# Patient Record
Sex: MALE | Race: WHITE | NOT HISPANIC OR LATINO | Employment: FULL TIME | URBAN - METROPOLITAN AREA
[De-identification: names, ages, dates, MRNs, and addresses within clinical notes are randomized per-mention and may not be internally consistent; named-entity substitution may affect disease eponyms.]

---

## 2017-06-21 ENCOUNTER — OFFICE VISIT (OUTPATIENT)
Dept: URGENT CARE | Facility: CLINIC | Age: 52
End: 2017-06-21
Payer: COMMERCIAL

## 2017-06-21 VITALS
SYSTOLIC BLOOD PRESSURE: 138 MMHG | BODY MASS INDEX: 27.59 KG/M2 | WEIGHT: 215 LBS | TEMPERATURE: 99.1 F | OXYGEN SATURATION: 96 % | RESPIRATION RATE: 14 BRPM | HEIGHT: 74 IN | DIASTOLIC BLOOD PRESSURE: 92 MMHG | HEART RATE: 94 BPM

## 2017-06-21 DIAGNOSIS — J01.40 ACUTE NON-RECURRENT PANSINUSITIS: Primary | ICD-10-CM

## 2017-06-21 DIAGNOSIS — J06.9 URI WITH COUGH AND CONGESTION: ICD-10-CM

## 2017-06-21 PROCEDURE — 99214 OFFICE O/P EST MOD 30 MIN: CPT | Performed by: PHYSICIAN ASSISTANT

## 2017-06-21 RX ORDER — PROMETHAZINE HYDROCHLORIDE AND CODEINE PHOSPHATE 6.25; 1 MG/5ML; MG/5ML
5 SYRUP ORAL 4 TIMES DAILY PRN
Qty: 240 ML | Refills: 0 | Status: SHIPPED | OUTPATIENT
Start: 2017-06-21 | End: 2017-07-05

## 2017-06-21 RX ORDER — DOXYCYCLINE HYCLATE 100 MG
100 TABLET ORAL 2 TIMES DAILY
Qty: 14 TAB | Refills: 0 | Status: SHIPPED | OUTPATIENT
Start: 2017-06-21 | End: 2017-06-28

## 2017-06-21 RX ORDER — METHYLPREDNISOLONE 4 MG/1
TABLET ORAL
Qty: 21 TAB | Refills: 0 | Status: SHIPPED | OUTPATIENT
Start: 2017-06-21 | End: 2018-05-09

## 2017-06-21 ASSESSMENT — COPD QUESTIONNAIRES: COPD: 0

## 2017-06-21 ASSESSMENT — ENCOUNTER SYMPTOMS: COUGH: 1

## 2017-06-21 NOTE — PATIENT INSTRUCTIONS

## 2017-06-21 NOTE — PROGRESS NOTES
Subjective:      Pt is a 51 y.o. male who presents with Cough            Cough  This is a new problem. The current episode started in the past 7 days. The problem has been gradually worsening. The problem occurs constantly. The cough is productive of purulent sputum. The symptoms are aggravated by cold air, exercise and lying down. He has tried OTC cough suppressant for the symptoms. The treatment provided no relief. His past medical history is significant for bronchitis. There is no history of asthma, bronchiectasis, COPD or emphysema.   PT presents to  clinic today complaining of sore throat, fevers, chills, watery eyes, pressure in ears, cough, fatigue, runny nose. PT denies CP, SOB, NVD, abdominal pain, joint pain. PT states these symptoms began around 7 days ago and that the pt's girlfriend has been sick on and off for the last week. PT states the pain is a 6/10 with sinus pressure, aching in nature and worse at night. Pt has not taken any medications for this condition. The pt's medication list, problem list, and allergies have been evaluated and reviewed during today's visit.      PMH:  Negative per pt.      PSH:  Negative per pt.      Fam Hx:  noncontributory      Soc HX:  Social History     Social History   • Marital Status:      Spouse Name: N/A   • Number of Children: N/A   • Years of Education: N/A     Occupational History   • Not on file.     Social History Main Topics   • Smoking status: Never Smoker    • Smokeless tobacco: Not on file   • Alcohol Use: Not on file   • Drug Use: Not on file   • Sexual Activity: Not on file     Other Topics Concern   • Not on file     Social History Narrative         Medications:    Current outpatient prescriptions:   •  doxycycline (VIBRAMYCIN) 100 MG Tab, Take 1 Tab by mouth 2 times a day for 7 days., Disp: 14 Tab, Rfl: 0  •  promethazine-codeine (PHENERGAN-CODEINE) 6.25-10 MG/5ML Syrup, Take 5 mL by mouth 4 times a day as needed for up to 14 days., Disp: 240  "mL, Rfl: 0  •  MethylPREDNISolone (MEDROL DOSEPAK) 4 MG Tablet Therapy Pack, Use as directed, Disp: 21 Tab, Rfl: 0  •  rosuvastatin (CRESTOR) 20 MG TABS, Take 20 mg by mouth every evening., Disp: , Rfl:   •  fluticasone (FLONASE) 50 MCG/ACT nasal spray, Spray 2 Sprays in nose every day., Disp: 16 g, Rfl: 0  •  pseudoephedrine-guaifenesin (MUCINEX D)  MG per tablet, Take 1 Tab by mouth every 12 hours., Disp: 60 Tab, Rfl: 0  •  fexofenadine (ALLEGRA) 180 MG tablet, Take 1 Tab by mouth every day., Disp: 30 Tab, Rfl: 0      Allergies:  Review of patient's allergies indicates no known allergies.        Review of Systems   Respiratory: Positive for cough.    Constitutional: Positive for chills and malaise/fatigue.   HENT: Positive for congestion and sore throat. Negative for ear pain.    Eyes: Negative for blurred vision, double vision and photophobia.   Respiratory continued: Positive for cough and sputum production. Negative for hemoptysis, shortness of breath and wheezing.    Cardiovascular: Negative for chest pain and palpitations.   Gastrointestinal: Negative for nausea, vomiting, abdominal pain, diarrhea and constipation.   Genitourinary: Negative for dysuria and flank pain.   Musculoskeletal: Negative for falls and myalgias.   Skin: Negative for itching and rash.   Neurological: Positive for headaches. Negative for dizziness and tingling.   Endo/Heme/Allergies: Does not bruise/bleed easily.   Psychiatric/Behavioral: Negative for depression. The patient is not nervous/anxious.               Objective:     /92 mmHg  Pulse 94  Temp(Src) 37.3 °C (99.1 °F)  Resp 14  Ht 1.88 m (6' 2.02\")  Wt 97.523 kg (215 lb)  BMI 27.59 kg/m2  SpO2 96%     Physical Exam      Physical Exam   Constitutional: PT is oriented to person, place, and time. PT appears well-developed and well-nourished. No distress.   HENT:   Head: Normocephalic and atraumatic.   Right Ear: Hearing, tympanic membrane, external ear and ear canal " normal.   Left Ear: Hearing, tympanic membrane, external ear and ear canal normal.   Nose: Mucosal edema, rhinorrhea and sinus tenderness present. Right sinus exhibits frontal sinus tenderness. Left sinus exhibits frontal sinus tenderness.   Mouth/Throat: Uvula is midline. Mucous membranes are pale. Posterior oropharyngeal edema and posterior oropharyngeal erythema present. No oropharyngeal exudate.   Eyes: Conjunctivae normal and EOM are normal. Pupils are equal, round, and reactive to light. Right eye exhibits no discharge. Left eye exhibits no discharge.   Neck: Normal range of motion. Neck supple. No thyromegaly present.   Cardiovascular: Normal rate, regular rhythm, normal heart sounds and intact distal pulses.  Exam reveals no gallop and no friction rub.    No murmur heard.  Pulmonary/Chest: Effort normal. No respiratory distress.   Abdominal: Soft. Bowel sounds are normal. PT exhibits no distension and no mass. There is no tenderness. There is no rebound and no guarding.   Musculoskeletal: Normal range of motion. PT exhibits no edema and no tenderness.   Lymphadenopathy:     PT has no cervical adenopathy.   Neurological: Pt is alert and oriented to person, place, and time. Pt has normal reflexes. No cranial nerve deficit.   Skin: Skin is warm and dry. No rash noted. No erythema.   Psychiatric: PT has a normal mood and affect. Pt behavior is normal. Judgment and thought content normal.          Assessment/Plan:     1. Acute non-recurrent pansinusitis    - doxycycline (VIBRAMYCIN) 100 MG Tab; Take 1 Tab by mouth 2 times a day for 7 days.  Dispense: 14 Tab; Refill: 0  - MethylPREDNISolone (MEDROL DOSEPAK) 4 MG Tablet Therapy Pack; Use as directed  Dispense: 21 Tab; Refill: 0    2. URI with cough and congestion    - promethazine-codeine (PHENERGAN-CODEINE) 6.25-10 MG/5ML Syrup; Take 5 mL by mouth 4 times a day as needed for up to 14 days.  Dispense: 240 mL; Refill: 0  - MethylPREDNISolone (MEDROL DOSEPAK) 4 MG  Tablet Therapy Pack; Use as directed  Dispense: 21 Tab; Refill: 0    Rest, fluids encouraged.  OTC decongestant for congestion/cough  AVS with medical info given.  Pt was in full understanding and agreement with the plan.  Follow-up as needed if symptoms worsen or fail to improve.

## 2017-06-21 NOTE — MR AVS SNAPSHOT
"        Francesco Rodriguez   2017 2:45 PM   Office Visit   MRN: [id number]    Department:  [facility]   Dept Phone:  [phone]    Description:  Male : 1965   Provider:  Carl Slade PA-C           Reason for Visit     Cough Heavy dry cough      Allergies as of 2017     No Known Allergies      You were diagnosed with     Acute non-recurrent pansinusitis   [[id number]]  -  Primary     URI with cough and congestion   [[id number]]         Vital Signs     Blood Pressure Pulse Temperature Respirations Height Weight    138/92 mmHg 94 37.3 °C (99.1 °F) 14 1.88 m (6' 2.02\") 97.523 kg (215 lb)    Body Mass Index Oxygen Saturation Smoking Status             27.59 kg/m2 96% Never Smoker          Basic Information     Date Of Birth Sex Race Ethnicity Preferred Language    1965 Male White Non- English      Health Maintenance        Date Due Completion Dates    IMM DTaP/Tdap/Td Vaccine (1 - Tdap) 1986 ---            Current Immunizations     No immunizations on file.      Below and/or attached are the medications your provider expects you to take. Review all of your home medications and newly ordered medications with your provider and/or pharmacist. Follow medication instructions as directed by your provider and/or pharmacist. Please keep your medication list with you and share with your provider. Update the information when medications are discontinued, doses are changed, or new medications (including over-the-counter products) are added; and carry medication information at all times in the event of emergency situations     Allergies:  No Known Allergies          Medications  Valid as of: 2017 -  3:46 PM    Generic Name Brand Name Tablet Size Instructions for use    Doxycycline Hyclate (Tab) VIBRAMYCIN 100 MG Take 1 Tab by mouth 2 times a day for 7 days.        Fexofenadine HCl (Tab) ALLEGRA 180 MG Take 1 Tab by mouth every day.        Fluticasone Propionate (Suspension) FLONASE 50 " MCG/ACT Spray 2 Sprays in nose every day.        MethylPREDNISolone (Tablet Therapy Pack) MEDROL DOSEPAK 4 MG Use as directed        Promethazine-Codeine (Syrup) PHENERGAN-CODEINE 6.25-10 MG/5ML Take 5 mL by mouth 4 times a day as needed for up to 14 days.        Pseudoephedrine-Guaifenesin (TABLET SR 12 HR) MUCINEX D  MG Take 1 Tab by mouth every 12 hours.        Rosuvastatin Calcium (Tab) CRESTOR 20 MG Take 20 mg by mouth every evening.        .                 Medicines prescribed today were sent to:     [id number] - [place], [place] - [address]    [address] [place] [address]    Phone: [phone] Fax: [phone]    Open 24 Hours?: No      Medication refill instructions:       If your prescription bottle indicates you have medication refills left, it is not necessary to call your provider’s office. Please contact your pharmacy and they will refill your medication.    If your prescription bottle indicates you do not have any refills left, you may request refills at any time through one of the following ways: The online evidanza system (except Urgent Care), by calling your provider’s office, or by asking your pharmacy to contact your provider’s office with a refill request. Medication refills are processed only during regular business hours and may not be available until the next business day. Your provider may request additional information or to have a follow-up visit with you prior to refilling your medication.   *Please Note: Medication refills are assigned a new Rx number when refilled electronically. Your pharmacy may indicate that no refills were authorized even though a new prescription for the same medication is available at the pharmacy. Please request the medicine by name with the pharmacy before contacting your provider for a refill.        Instructions    Sinusitis, Adult  Sinusitis is redness, soreness, and inflammation of the paranasal sinuses. Paranasal sinuses are air  pockets within the bones of your face. They are located beneath your eyes, in the middle of your forehead, and above your eyes. In healthy paranasal sinuses, mucus is able to drain out, and air is able to circulate through them by way of your nose. However, when your paranasal sinuses are inflamed, mucus and air can become trapped. This can allow bacteria and other germs to grow and cause infection.  Sinusitis can develop quickly and last only a short time (acute) or continue over a long period (chronic). Sinusitis that lasts for more than 12 weeks is considered chronic.  CAUSES  Causes of sinusitis include:  · Allergies.  · Structural abnormalities, such as displacement of the cartilage that separates your nostrils (deviated septum), which can decrease the air flow through your nose and sinuses and affect sinus drainage.  · Functional abnormalities, such as when the small hairs (cilia) that line your sinuses and help remove mucus do not work properly or are not present.  SIGNS AND SYMPTOMS  Symptoms of acute and chronic sinusitis are the same. The primary symptoms are pain and pressure around the affected sinuses. Other symptoms include:  · Upper toothache.  · Earache.  · Headache.  · Bad breath.  · Decreased sense of smell and taste.  · A cough, which worsens when you are lying flat.  · Fatigue.  · Fever.  · Thick drainage from your nose, which often is green and may contain pus (purulent).  · Swelling and warmth over the affected sinuses.  DIAGNOSIS  Your health care provider will perform a physical exam. During your exam, your health care provider may perform any of the following to help determine if you have acute sinusitis or chronic sinusitis:  · Look in your nose for signs of abnormal growths in your nostrils (nasal polyps).  · Tap over the affected sinus to check for signs of infection.  · View the inside of your sinuses using an imaging device that has a light attached (endoscope).  If your health care  provider suspects that you have chronic sinusitis, one or more of the following tests may be recommended:  · Allergy tests.  · Nasal culture. A sample of mucus is taken from your nose, sent to a lab, and screened for bacteria.  · Nasal cytology. A sample of mucus is taken from your nose and examined by your health care provider to determine if your sinusitis is related to an allergy.  TREATMENT  Most cases of acute sinusitis are related to a viral infection and will resolve on their own within 10 days. Sometimes, medicines are prescribed to help relieve symptoms of both acute and chronic sinusitis. These may include pain medicines, decongestants, nasal steroid sprays, or saline sprays.  However, for sinusitis related to a bacterial infection, your health care provider will prescribe antibiotic medicines. These are medicines that will help kill the bacteria causing the infection.  Rarely, sinusitis is caused by a fungal infection. In these cases, your health care provider will prescribe antifungal medicine.  For some cases of chronic sinusitis, surgery is needed. Generally, these are cases in which sinusitis recurs more than 3 times per year, despite other treatments.  HOME CARE INSTRUCTIONS  · Drink plenty of water. Water helps thin the mucus so your sinuses can drain more easily.  · Use a humidifier.  · Inhale steam 3-4 times a day (for example, sit in the bathroom with the shower running).  · Apply a warm, moist washcloth to your face 3-4 times a day, or as directed by your health care provider.  · Use saline nasal sprays to help moisten and clean your sinuses.  · Take medicines only as directed by your health care provider.  · If you were prescribed either an antibiotic or antifungal medicine, finish it all even if you start to feel better.  SEEK IMMEDIATE MEDICAL CARE IF:  · You have increasing pain or severe headaches.  · You have nausea, vomiting, or drowsiness.  · You have swelling around your face.  · You  have vision problems.  · You have a stiff neck.  · You have difficulty breathing.     This information is not intended to replace advice given to you by your health care provider. Make sure you discuss any questions you have with your health care provider.     Document Released: 12/18/2006 Document Revised: 01/08/2016 Document Reviewed: 01/01/2013  [employer] Interactive Patient Education ©2016 [employer] Inc.            sellpoints Access Code: [email or web address]  Expires: 7/21/2017  3:02 PM    sellpoints  A secure, online tool to manage your health information     [employer]’s sellpoints® is a secure, online tool that connects you to your personalized health information from the privacy of your home -- day or night - making it very easy for you to manage your healthcare. Once the activation process is completed, you can even access your medical information using the sellpoints dario, which is available for free in the Apple Dario store or Google Play store.     sellpoints provides the following levels of access (as shown below):   My Chart Features   [facility] Doctor [facility]  Specialists [facility]  Urgent  Care [facility]  Primary Care  Doctor   Email your healthcare team securely and privately 24/7 X X X    Manage appointments: schedule your next appointment; view details of past/upcoming appointments X      Request prescription refills. X      View recent personal medical records, including lab and immunizations X X X X   View health record, including health history, allergies, medications X X X X   Read reports about your outpatient visits, procedures, consult and ER notes X X X X   See your discharge summary, which is a recap of your hospital and/or ER visit that includes your diagnosis, lab results, and care plan. X X       How to register for sellpoints:  1. Go to  [email or web address].  2. Click on the Sign Up Now box, which takes you to the New Member Sign Up page. You will need to provide the following  information:  a. Enter your DeskActive Access Code exactly as it appears at the top of this page. (You will not need to use this code after you’ve completed the sign-up process. If you do not sign up before the expiration date, you must request a new code.)   b. Enter your date of birth.   c. Enter your home email address.   d. Click Submit, and follow the next screen’s instructions.  3. Create a DeskActive ID. This will be your DeskActive login ID and cannot be changed, so think of one that is secure and easy to remember.  4. Create a DeskActive password. You can change your password at any time.  5. Enter your Password Reset Question and Answer. This can be used at a later time if you forget your password.   6. Enter your e-mail address. This allows you to receive e-mail notifications when new information is available in DeskActive.  7. Click Sign Up. You can now view your health information.    For assistance activating your DeskActive account, call [phone]

## 2018-05-09 ENCOUNTER — OFFICE VISIT (OUTPATIENT)
Dept: MEDICAL GROUP | Age: 53
End: 2018-05-09
Payer: COMMERCIAL

## 2018-05-09 VITALS
TEMPERATURE: 97.7 F | HEIGHT: 74 IN | OXYGEN SATURATION: 96 % | WEIGHT: 226 LBS | SYSTOLIC BLOOD PRESSURE: 142 MMHG | BODY MASS INDEX: 29 KG/M2 | DIASTOLIC BLOOD PRESSURE: 88 MMHG | HEART RATE: 76 BPM

## 2018-05-09 DIAGNOSIS — Z12.11 COLON CANCER SCREENING: ICD-10-CM

## 2018-05-09 DIAGNOSIS — E78.00 HYPERCHOLESTEROLEMIA: ICD-10-CM

## 2018-05-09 DIAGNOSIS — R03.0 ELEVATED BLOOD PRESSURE READING: ICD-10-CM

## 2018-05-09 DIAGNOSIS — Z12.83 SKIN CANCER SCREENING: ICD-10-CM

## 2018-05-09 DIAGNOSIS — Z23 NEED FOR VACCINATION: ICD-10-CM

## 2018-05-09 PROCEDURE — 99204 OFFICE O/P NEW MOD 45 MIN: CPT | Mod: 25 | Performed by: INTERNAL MEDICINE

## 2018-05-09 PROCEDURE — 90471 IMMUNIZATION ADMIN: CPT | Performed by: INTERNAL MEDICINE

## 2018-05-09 PROCEDURE — 90715 TDAP VACCINE 7 YRS/> IM: CPT | Performed by: INTERNAL MEDICINE

## 2018-05-09 ASSESSMENT — PATIENT HEALTH QUESTIONNAIRE - PHQ9: CLINICAL INTERPRETATION OF PHQ2 SCORE: 0

## 2018-05-09 NOTE — ASSESSMENT & PLAN NOTE
Patient stated that he has high cholesterol and was treated with Crestor for 3 years. He stopped taking Crestor 3 years ago as his cholesterol level improved. He has been control his cholesterol with diet and physical exercise. He has not rechecked his lipid panel lately. He has family history of coronary artery disease in both parents at age 60-65.

## 2018-05-09 NOTE — ASSESSMENT & PLAN NOTE
Patient stated that he does not have high blood pressure or hypertension, but he uses to have high blood pressure in doctor's office. He also stated that he had caffeinated energy pill this morning before he went to do exercise at gym. He has blood pressure monitoring at home. He can monitor his blood pressure at home. He used to drinks 2-3 Beers or 1-2 shot vodka 3 times a week when he goes out for dinner with friends. He denied headache, chest pain, shortness of breath.

## 2018-05-09 NOTE — PROGRESS NOTES
Francesco Rodriguez is a 52 y.o. male here to establish care and the evaluation and management of:      HPI:    Elevated blood pressure reading  Patient stated that he does not have high blood pressure or hypertension, but he uses to have high blood pressure in doctor's office. He also stated that he had caffeinated energy pill this morning before he went to do exercise at gym. He has blood pressure monitoring at home. He can monitor his blood pressure at home. He used to drinks 2-3 Beers or 1-2 shot vodka 3 times a week when he goes out for dinner with friends. He denied headache, chest pain, shortness of breath.    Hypercholesterolemia  Patient stated that he has high cholesterol and was treated with Crestor for 3 years. He stopped taking Crestor 3 years ago as his cholesterol level improved. He has been control his cholesterol with diet and physical exercise. He has not rechecked his lipid panel lately. He has family history of coronary artery disease in both parents at age 60-65.    Skin cancer screening  Patient has moles on his body. He also has seborrhoic keratoses. He stated that his brother was diagnosed with skin cancer that was removed. He did not know what type of skin cancer. He wants to refer to dermatology for skin cancer screening. He never has history of skin cancer.    Current medicines (including changes today)  Current Outpatient Prescriptions   Medication Sig Dispense Refill   • fluticasone (FLONASE) 50 MCG/ACT nasal spray Spray 2 Sprays in nose every day. 16 g 0   • fexofenadine (ALLEGRA) 180 MG tablet Take 1 Tab by mouth every day. 30 Tab 0     No current facility-administered medications for this visit.      He  has no past medical history on file.  He  has no past surgical history on file.  Social History   Substance Use Topics   • Smoking status: Never Smoker   • Smokeless tobacco: Never Used   • Alcohol use 8.4 oz/week     9 Cans of beer, 5 Shots of liquor per week      Comment: socially  "    Social History     Social History Narrative   • No narrative on file     Family History   Problem Relation Age of Onset   • Heart Disease Mother 60     CAD s/p cardiac bypass   • Heart Disease Father 65     CAD s/p cardiac bypass   • Hypertension Brother    • Hyperlipidemia Brother    • Diabetes Neg Hx    • Stroke Neg Hx      Family Status   Relation Status   • Mother    • Father Alive   • Brother Alive   • Maternal Grandmother    • Maternal Grandfather    • Paternal Grandmother    • Paternal Grandfather    • Brother Alive   • Neg Hx      Health Maintenance Topics with due status: Overdue       Topic Date Due    IMM DTaP/Tdap/Td Vaccine 1986    COLONOSCOPY 2017         ROS    Gen.: Denied weight change, appetite change, fatigue.  ENT: Denied sinus tenderness, nasal congestion, runny nose, or sore throat  CVS: Denied chest pain, palpitations, legs swelling.  Respiratory: Denied cough, shortness of breath, wheezing.  GI: Denied abdominal pain, constipation or diarrhea.  Endocrine: Denied temperature intolerance, increased frequency of urination, polyphagia or polydipsia.  Musculoskeletal: Denied back pain or joint pain.    All other systems reviewed and are negative     Objective:     Blood pressure 142/88, pulse 76, temperature 36.5 °C (97.7 °F), height 1.88 m (6' 2.02\"), weight 102.5 kg (226 lb), SpO2 96 %. Body mass index is 29 kg/m².  Physical Exam:    Constitutional: Well nourished and Well developed, Alert, no distress.  Skin: Warm, dry, good turgor, no rashes in visible areas.  Eye: Equal, round and reactive, conjunctiva clear, lids normal.  ENMT: Lips without lesions, good dentition, oropharynx clear.  Neck: Trachea midline, no masses, no thyromegaly. No cervical or supraclavicular lymphadenopathy.  Respiratory: Unlabored respiratory effort, lungs clear to auscultation, no wheezes, no ronchi.  Cardiovascular: Normal S1, S2, no murmur, no edema.  Abdomen: " Soft, non distended, non-tender, no masses, no hepatosplenomegaly. Bowel sound normal.  Extremities: No edema, no clubbing, no cyanosis.  Psych: Alert and oriented x3, normal affect and mood.        Assessment and Plan:   The following treatment plan was discussed       1. Hypercholesterolemia  - Not on medication currently. He wants to try to control cholesterol with diet and exercise.  - Advised to eat low fat, low carbohydrate and high fiber diet as well as do cardio physical exercise regularly.   - Recheck lab in 3 months for follow-up.  - COMP METABOLIC PANEL; Future  - LIPID PROFILE; Future    2. Elevated blood pressure reading  - Discussed to eat low salt diet, do regular physical exercise and cut down alcohol intake and caffeine intake.  - Recommend to monitor blood pressure and heart rate at home.  - CBC WITH DIFFERENTIAL; Future  - COMP METABOLIC PANEL; Future    3. Skin cancer screening  - Refer to dermatology for skin cancer screening  - REFERRAL TO DERMATOLOGY    4. Colon cancer screening  - Concerning for colon cancer screening. Discussed the risks and benefits of colonoscopy versus Cologuard. Refer to GI for colon cancer screening  - REFERRAL TO GASTROENTEROLOGY    5. Need for vaccination  - Tdap vaccine was given today after reviewing risks and benefits as well as side effects of vaccine.  - TDAP VACCINE =>8YO IM    Face-to-face time spent 50 minutes with patient and more than half of that time spent for counseling and cooperating of care for medical problems listed above.     Records requested.  Followup: Return in about 3 months (around 8/9/2018), or if symptoms worsen or fail to improve, for hypercholesterolemia, high blood pressure, lab review. sooner should new symptoms or problems arise.      Please note that this dictation was created using voice recognition software. I have made every reasonable attempt to correct obvious errors, but I expect that there may have unintended errors in text,  spelling, punctuation, or grammar that I did not discover.

## 2018-05-09 NOTE — ASSESSMENT & PLAN NOTE
Patient has moles on his body. He also has seborrhoic keratoses. He stated that his brother was diagnosed with skin cancer that was removed. He did not know what type of skin cancer. He wants to refer to dermatology for skin cancer screening. He never has history of skin cancer.

## 2018-08-13 ENCOUNTER — APPOINTMENT (OUTPATIENT)
Dept: MEDICAL GROUP | Age: 53
End: 2018-08-13
Payer: COMMERCIAL

## 2018-11-08 ENCOUNTER — APPOINTMENT (RX ONLY)
Dept: URBAN - METROPOLITAN AREA CLINIC 35 | Facility: CLINIC | Age: 53
Setting detail: DERMATOLOGY
End: 2018-11-08

## 2018-11-08 DIAGNOSIS — D22 MELANOCYTIC NEVI: ICD-10-CM

## 2018-11-08 DIAGNOSIS — L82.1 OTHER SEBORRHEIC KERATOSIS: ICD-10-CM

## 2018-11-08 DIAGNOSIS — Z71.89 OTHER SPECIFIED COUNSELING: ICD-10-CM

## 2018-11-08 DIAGNOSIS — L91.8 OTHER HYPERTROPHIC DISORDERS OF THE SKIN: ICD-10-CM

## 2018-11-08 DIAGNOSIS — L81.4 OTHER MELANIN HYPERPIGMENTATION: ICD-10-CM

## 2018-11-08 DIAGNOSIS — L20.9 ATOPIC DERMATITIS, UNSPECIFIED: ICD-10-CM | Status: RESOLVED

## 2018-11-08 PROBLEM — D22.5 MELANOCYTIC NEVI OF TRUNK: Status: ACTIVE | Noted: 2018-11-08

## 2018-11-08 PROCEDURE — ? ADDITIONAL NOTES

## 2018-11-08 PROCEDURE — ? COUNSELING

## 2018-11-08 PROCEDURE — ? OBSERVATION

## 2018-11-08 PROCEDURE — 99203 OFFICE O/P NEW LOW 30 MIN: CPT

## 2018-11-08 ASSESSMENT — LOCATION DETAILED DESCRIPTION DERM
LOCATION DETAILED: LEFT DISTAL CALF
LOCATION DETAILED: RIGHT ANTERIOR DISTAL THIGH
LOCATION DETAILED: LEFT PROXIMAL PRETIBIAL REGION
LOCATION DETAILED: RIGHT DISTAL POSTERIOR THIGH
LOCATION DETAILED: LEFT SUPERIOR LATERAL UPPER BACK
LOCATION DETAILED: RIGHT AXILLARY VAULT
LOCATION DETAILED: PERIUMBILICAL SKIN
LOCATION DETAILED: LEFT ANTERIOR DISTAL THIGH
LOCATION DETAILED: RIGHT PROXIMAL CALF
LOCATION DETAILED: RIGHT PROXIMAL PRETIBIAL REGION
LOCATION DETAILED: RIGHT SUPERIOR LATERAL MIDBACK
LOCATION DETAILED: RIGHT LATERAL UPPER BACK
LOCATION DETAILED: LEFT INFERIOR UPPER BACK
LOCATION DETAILED: RIGHT DISTAL CALF
LOCATION DETAILED: LEFT DISTAL POSTERIOR THIGH

## 2018-11-08 ASSESSMENT — LOCATION SIMPLE DESCRIPTION DERM
LOCATION SIMPLE: RIGHT UPPER BACK
LOCATION SIMPLE: RIGHT AXILLARY VAULT
LOCATION SIMPLE: RIGHT POSTERIOR THIGH
LOCATION SIMPLE: RIGHT CALF
LOCATION SIMPLE: RIGHT LOWER BACK
LOCATION SIMPLE: LEFT PRETIBIAL REGION
LOCATION SIMPLE: LEFT UPPER BACK
LOCATION SIMPLE: LEFT CALF
LOCATION SIMPLE: ABDOMEN
LOCATION SIMPLE: RIGHT THIGH
LOCATION SIMPLE: LEFT THIGH
LOCATION SIMPLE: RIGHT PRETIBIAL REGION
LOCATION SIMPLE: LEFT POSTERIOR THIGH

## 2018-11-08 ASSESSMENT — LOCATION ZONE DERM
LOCATION ZONE: LEG
LOCATION ZONE: AXILLAE
LOCATION ZONE: TRUNK

## 2018-11-08 NOTE — PROCEDURE: ADDITIONAL NOTES
Detail Level: Zone
Additional Notes: When patient gets into hot tubs he gets a “rash” patient states Another dermatologis called it eczema and prescribed a topical steroid that started with the letter “t” and was 0.1%. Patient changed the chemicals in the hot tub but still got the rash. Patient states no one else who enters the hot tub gets the rash; rash is very itchy but never lasts more than a week in one spot. Patient unsure if he had eczema as a child but he did have asthma, no new medications introduced per patient. \\Prisca. Russell recommends putting Vaseline or vanicream on the skin before getting in the tub; if rash lasts longer than one week patient advised to make an appointment with Dr. Wells to have rash evaluated.

## 2019-01-16 ENCOUNTER — OFFICE VISIT (OUTPATIENT)
Dept: URGENT CARE | Facility: CLINIC | Age: 54
End: 2019-01-16
Payer: COMMERCIAL

## 2019-01-16 VITALS
HEIGHT: 74 IN | WEIGHT: 220 LBS | DIASTOLIC BLOOD PRESSURE: 80 MMHG | OXYGEN SATURATION: 97 % | HEART RATE: 96 BPM | RESPIRATION RATE: 16 BRPM | SYSTOLIC BLOOD PRESSURE: 136 MMHG | BODY MASS INDEX: 28.23 KG/M2 | TEMPERATURE: 100.1 F

## 2019-01-16 DIAGNOSIS — H61.23 BILATERAL IMPACTED CERUMEN: ICD-10-CM

## 2019-01-16 DIAGNOSIS — J10.1 INFLUENZA A: ICD-10-CM

## 2019-01-16 DIAGNOSIS — R05.9 COUGH: ICD-10-CM

## 2019-01-16 LAB
FLUAV+FLUBV AG SPEC QL IA: ABNORMAL
INT CON NEG: ABNORMAL
INT CON POS: ABNORMAL

## 2019-01-16 PROCEDURE — 99214 OFFICE O/P EST MOD 30 MIN: CPT | Mod: 25 | Performed by: NURSE PRACTITIONER

## 2019-01-16 PROCEDURE — 69210 REMOVE IMPACTED EAR WAX UNI: CPT | Performed by: NURSE PRACTITIONER

## 2019-01-16 PROCEDURE — 87804 INFLUENZA ASSAY W/OPTIC: CPT | Performed by: NURSE PRACTITIONER

## 2019-01-16 RX ORDER — OSELTAMIVIR PHOSPHATE 75 MG/1
75 CAPSULE ORAL 2 TIMES DAILY
Qty: 10 CAP | Refills: 0 | Status: SHIPPED | OUTPATIENT
Start: 2019-01-16 | End: 2019-03-20

## 2019-01-16 RX ORDER — DEXTROMETHORPHAN HYDROBROMIDE AND PROMETHAZINE HYDROCHLORIDE 15; 6.25 MG/5ML; MG/5ML
5 SYRUP ORAL EVERY 4 HOURS PRN
Qty: 120 ML | Refills: 0 | Status: SHIPPED | OUTPATIENT
Start: 2019-01-16 | End: 2019-03-20

## 2019-01-16 ASSESSMENT — ENCOUNTER SYMPTOMS
WHEEZING: 0
COUGH: 1
MYALGIAS: 1
SPUTUM PRODUCTION: 1
SORE THROAT: 0
FEVER: 1
NAUSEA: 0
SHORTNESS OF BREATH: 0
HEADACHES: 0
CHILLS: 1
EYE PAIN: 0
SINUS PAIN: 0
DIZZINESS: 0
VOMITING: 0

## 2019-01-17 NOTE — PROGRESS NOTES
"Subjective:   Francesco Rodriguez is a 53 y.o. male who presents for Cough (2 days )        Cough   This is a new problem. Episode onset: 2 days. The problem has been gradually worsening. The problem occurs constantly. The cough is productive of sputum. Associated symptoms include chills, a fever, myalgias and nasal congestion. Pertinent negatives include no chest pain, headaches, rash, sore throat, shortness of breath or wheezing. Nothing aggravates the symptoms. He has tried OTC cough suppressant for the symptoms. The treatment provided no relief. There is no history of bronchitis.     Review of Systems   Constitutional: Positive for chills, fever and malaise/fatigue.   HENT: Positive for congestion. Negative for sinus pain and sore throat.    Eyes: Negative for pain.   Respiratory: Positive for cough and sputum production. Negative for shortness of breath and wheezing.    Cardiovascular: Negative for chest pain.   Gastrointestinal: Negative for nausea and vomiting.   Genitourinary: Negative for hematuria.   Musculoskeletal: Positive for myalgias.   Skin: Negative for rash.   Neurological: Negative for dizziness and headaches.     No Known Allergies   Objective:   /80   Pulse 96   Temp 37.8 °C (100.1 °F) (Oral)   Resp 16   Ht 1.88 m (6' 2\")   Wt 99.8 kg (220 lb)   SpO2 97%   BMI 28.25 kg/m²   Physical Exam   Constitutional: He is oriented to person, place, and time. He appears well-developed and well-nourished. No distress.   HENT:   Head: Normocephalic and atraumatic.   Nose: Nose normal. Right sinus exhibits no maxillary sinus tenderness and no frontal sinus tenderness. Left sinus exhibits no maxillary sinus tenderness and no frontal sinus tenderness.   Mouth/Throat: Uvula is midline, oropharynx is clear and moist and mucous membranes are normal. No posterior oropharyngeal edema, posterior oropharyngeal erythema or tonsillar abscesses. No tonsillar exudate.   Noted on physical exam bilateral ears impacted " with cerumen, unable to visualize TM      Eyes: Pupils are equal, round, and reactive to light. Conjunctivae and EOM are normal. Right eye exhibits no discharge. Left eye exhibits no discharge.   Cardiovascular: Normal rate and regular rhythm.    No murmur heard.  Pulmonary/Chest: Effort normal and breath sounds normal. No respiratory distress.   Abdominal: Soft. He exhibits no distension. There is no tenderness.   Neurological: He is alert and oriented to person, place, and time. He has normal reflexes. No sensory deficit.   Skin: Skin is warm, dry and intact.   Psychiatric: He has a normal mood and affect.         Assessment/Plan:     1. Cough  POCT Influenza A/B    promethazine-dextromethorphan (PROMETHAZINE-DM) 6.25-15 MG/5ML syrup   2. Bilateral impacted cerumen     3. Influenza A  oseltamivir (TAMIFLU) 75 MG Cap     Influenza positive   Advised to continue supportive care with Tylenol and/or ibuprofen for fevers and discomfort. Increased fluids and electrolytes.    It was noted on exam patient having bilateral cerumen impaction.  Procedure: Cerumen Removal  Risks and benefits of procedure discussed  Cerumen removed with curette and lavage after softening agent instilled  Patient tolerated well  Post procedure exam with clear canal and normal TM    Patient given precautionary s/sx that mandate immediate follow up and evaluation in the ED. Advised of risks of not doing so.    DDX, Supportive care, and indications for immediate follow-up discussed with patient.    Instructed to return to clinic or nearest emergency department if we are not available for any change in condition, further concerns, or worsening of symptoms.    The patient demonstrated a good understanding and agreed with the treatment plan.

## 2019-03-18 ENCOUNTER — OFFICE VISIT (OUTPATIENT)
Dept: URGENT CARE | Facility: CLINIC | Age: 54
End: 2019-03-18
Payer: COMMERCIAL

## 2019-03-18 ENCOUNTER — APPOINTMENT (OUTPATIENT)
Dept: RADIOLOGY | Facility: IMAGING CENTER | Age: 54
End: 2019-03-18
Attending: PHYSICIAN ASSISTANT
Payer: COMMERCIAL

## 2019-03-18 VITALS
HEIGHT: 74 IN | BODY MASS INDEX: 28.23 KG/M2 | DIASTOLIC BLOOD PRESSURE: 90 MMHG | WEIGHT: 220 LBS | SYSTOLIC BLOOD PRESSURE: 146 MMHG | OXYGEN SATURATION: 97 % | HEART RATE: 72 BPM | TEMPERATURE: 98.2 F | RESPIRATION RATE: 16 BRPM

## 2019-03-18 DIAGNOSIS — S92.352A CLOSED DISPLACED FRACTURE OF FIFTH METATARSAL BONE OF LEFT FOOT, INITIAL ENCOUNTER: ICD-10-CM

## 2019-03-18 DIAGNOSIS — S93.602A SPRAIN OF LEFT FOOT, INITIAL ENCOUNTER: ICD-10-CM

## 2019-03-18 PROCEDURE — 99214 OFFICE O/P EST MOD 30 MIN: CPT | Performed by: PHYSICIAN ASSISTANT

## 2019-03-18 PROCEDURE — A4590 SPECIAL CASTING MATERIAL: HCPCS | Performed by: PHYSICIAN ASSISTANT

## 2019-03-18 PROCEDURE — 73630 X-RAY EXAM OF FOOT: CPT | Mod: TC,FY,LT | Performed by: PHYSICIAN ASSISTANT

## 2019-03-18 ASSESSMENT — ENCOUNTER SYMPTOMS
SHORTNESS OF BREATH: 0
FOCAL WEAKNESS: 0
SEIZURES: 0
TINGLING: 0
SENSORY CHANGE: 0
COUGH: 0
LOSS OF CONSCIOUSNESS: 0
SPEECH CHANGE: 0
BLURRED VISION: 0
FEVER: 0
HEADACHES: 0
DOUBLE VISION: 0
TREMORS: 0
DIZZINESS: 0
CHILLS: 0
PALPITATIONS: 0

## 2019-03-18 ASSESSMENT — PAIN SCALES - GENERAL: PAINLEVEL: 8=MODERATE-SEVERE PAIN

## 2019-03-19 NOTE — PATIENT INSTRUCTIONS
Foot Fracture  Your caregiver has diagnosed you as having a foot fracture (broken bone). Your foot has many bones. You have a fracture, or break, in one of these bones. In some cases, your doctor may put on a splint or removable fracture boot until the swelling in your foot has lessened. A cast may or may not be required.  HOME CARE INSTRUCTIONS   If you do not have a cast or splint:  · You may bear weight on your injured foot as tolerated or advised.   · Do not put any weight on your injured foot for as long as directed by your caregiver. Slowly increase the amount of time you walk on the foot as the pain and swelling allows or as advised.   · Use crutches until you can bear weight without pain. A gradual increase in weight bearing may help.   · Apply ice to the injury for 15-20 minutes each hour while awake for the first 2 days. Put the ice in a plastic bag and place a towel between the bag of ice and your skin.   · If an ace bandage (stretchy, elastic wrapping bandage) was applied, you may re-wrap it if ankle is more painful or your toes become cold and swollen.   If you have a cast or splint:  · Use your crutches for as long as directed by your caregiver.   · To lessen the swelling, keep the injured foot elevated on pillows while lying down or sitting. Elevate your foot above your heart.   · Apply ice to the injury for 15-20 minutes each hour while awake for the first 2 days. Put the ice in a plastic bag and place a thin towel between the bag of ice and your cast.   · Plaster or fiberglass cast:   · Do not try to scratch the skin under the cast using a sharp or pointed object down the cast.   · Check the skin around the cast every day. You may put lotion on any red or sore areas.   · Keep your cast clean and dry.   · Plaster splint:   · Wear the splint until you are seen for a follow-up examination.   · You may loosen the elastic around the splint if your toes become numb, tingle, or turn blue or cold. Do not  rest it on anything harder than a pillow in the first 24 hours.   · Do not put pressure on any part of your splint. Use your crutches as directed.   · Keep your splint dry. It can be protected during bathing with a plastic bag. Do not lower the splint into water.   · If you have a fracture boot you may remove it to shower. Bear weight only as instructed by your caregiver.   · Only take over-the-counter or prescription medicines for pain, discomfort, or fever as directed by your caregiver.   SEEK IMMEDIATE MEDICAL CARE IF:   · Your cast gets damaged or breaks.   · You have continued severe pain or more swelling than you did before the cast was put on.   · Your skin or nails of your casted foot turn blue, gray, feel cold or numb.   · There is a bad smell from your cast.   · There is severe pain with movement of your toes.   · There are new stains and/or drainage coming from under the cast.   MAKE SURE YOU:   · Understand these instructions.   · Will watch your condition.   · Will get help right away if you are not doing well or get worse.   Document Released: 12/15/2001 Document Revised: 03/11/2013 Document Reviewed: 01/21/2010  [employer]® Patient Information ©2013 [employer].

## 2019-03-19 NOTE — PROGRESS NOTES
Subjective:      Francesco Rodriguez is a 53 y.o. male who presents with Foot Pain (X4 days (L) foot injury/Swelling and brusing)            Foot Problem   This is a new problem. The current episode started in the past 7 days (from a fall). The problem occurs constantly. The problem has been unchanged. Pertinent negatives include no chest pain, chills, coughing, fever, headaches or rash. Associated symptoms comments: Swelling and pain of the left foot  . The symptoms are aggravated by walking. He has tried nothing for the symptoms. The treatment provided no relief.       Review of Systems   Constitutional: Negative for chills and fever.   Eyes: Negative for blurred vision and double vision.   Respiratory: Negative for cough and shortness of breath.    Cardiovascular: Negative for chest pain and palpitations.   Musculoskeletal:        Left foot swelling and pain   Skin: Negative for rash.   Neurological: Negative for dizziness, tingling, tremors, sensory change, speech change, focal weakness, seizures, loss of consciousness and headaches.   All other systems reviewed and are negative.    PMH:  has no past medical history on file.  MEDS:   Current Outpatient Prescriptions:   •  oseltamivir (TAMIFLU) 75 MG Cap, Take 1 Cap by mouth 2 times a day. (Patient not taking: Reported on 3/18/2019), Disp: 10 Cap, Rfl: 0  •  promethazine-dextromethorphan (PROMETHAZINE-DM) 6.25-15 MG/5ML syrup, Take 5 mL by mouth every four hours as needed for Cough. (Patient not taking: Reported on 3/18/2019), Disp: 120 mL, Rfl: 0  •  fluticasone (FLONASE) 50 MCG/ACT nasal spray, Spray 2 Sprays in nose every day. (Patient not taking: Reported on 3/18/2019), Disp: 16 g, Rfl: 0  •  fexofenadine (ALLEGRA) 180 MG tablet, Take 1 Tab by mouth every day. (Patient not taking: Reported on 3/18/2019), Disp: 30 Tab, Rfl: 0  ALLERGIES: No Known Allergies  SURGHX: History reviewed. No pertinent surgical history.  SOCHX:  reports that he has never smoked. He has  "never used smokeless tobacco. He reports that he drinks about 8.4 oz of alcohol per week . He reports that he uses drugs, including Marijuana.  FH: Family history was reviewed, no pertinent findings to report  Medications, Allergies, and current problem list reviewed today in Epic       Objective:     /90 (BP Location: Left arm, Patient Position: Sitting, BP Cuff Size: Adult)   Pulse 72   Temp 36.8 °C (98.2 °F) (Temporal)   Resp 16   Ht 1.88 m (6' 2\")   Wt 99.8 kg (220 lb)   SpO2 97%   BMI 28.25 kg/m²      Physical Exam   Constitutional: He is oriented to person, place, and time. He appears well-developed and well-nourished.  Non-toxic appearance. He does not have a sickly appearance. He does not appear ill. No distress.   HENT:   Head: Normocephalic and atraumatic.   Right Ear: External ear normal.   Left Ear: External ear normal.   Eyes: Conjunctivae and EOM are normal.   Neck: Normal range of motion. Neck supple.   Cardiovascular: Normal rate, regular rhythm, normal heart sounds, intact distal pulses and normal pulses.    Pulmonary/Chest: Effort normal and breath sounds normal.   Musculoskeletal: Normal range of motion. He exhibits tenderness. He exhibits no edema or deformity.   Swelling of the left foot.  PTP over the 5th metatarsal.  No joint pain above or below injury.  Neurovascularly intact distally from injury.     Neurological: He is alert and oriented to person, place, and time. He has normal reflexes. He displays normal reflexes. He exhibits normal muscle tone. Coordination normal.   Skin: Skin is warm and dry. He is not diaphoretic.   Psychiatric: He has a normal mood and affect. His behavior is normal. Judgment and thought content normal.   Vitals reviewed.         3/18/2019 5:25 PM    HISTORY/REASON FOR EXAM:  Left foot pain after trauma      TECHNIQUE/EXAM DESCRIPTION AND NUMBER OF VIEWS:  3 views of the LEFT foot.    COMPARISON:  None.    FINDINGS:  Bone mineralization is " age-appropriate. There is a mildly displaced comminuted fracture of the base of the fifth metatarsal. No additional fracture or dislocation is identified.   Impression       Mildly displaced comminuted fracture of the base of the fifth metatarsal.          Assessment/Plan:     1. Closed displaced fracture of fifth metatarsal bone of left foot, initial encounter  - Post Splint/Crutches/Non weight bearing  - DX-FOOT-COMPLETE 3+ LEFT; Future  - REFERRAL TO SPORTS MEDICINE    Differential diagnosis, natural history, supportive care discussed. Follow-up with primary care provider within 7-10 days, emergency room precautions discussed.  Patient and/or family appears understanding of information.  Handout and review of patients diagnosis and treatment was discussed extensively.

## 2019-03-20 ENCOUNTER — OFFICE VISIT (OUTPATIENT)
Dept: MEDICAL GROUP | Facility: CLINIC | Age: 54
End: 2019-03-20
Payer: COMMERCIAL

## 2019-03-20 VITALS
HEIGHT: 74 IN | SYSTOLIC BLOOD PRESSURE: 120 MMHG | BODY MASS INDEX: 28.23 KG/M2 | WEIGHT: 220 LBS | DIASTOLIC BLOOD PRESSURE: 80 MMHG | TEMPERATURE: 97.7 F | OXYGEN SATURATION: 98 % | RESPIRATION RATE: 14 BRPM | HEART RATE: 76 BPM

## 2019-03-20 DIAGNOSIS — S92.352A CLOSED DISPLACED FRACTURE OF FIFTH METATARSAL BONE OF LEFT FOOT, INITIAL ENCOUNTER: ICD-10-CM

## 2019-03-20 PROCEDURE — 99203 OFFICE O/P NEW LOW 30 MIN: CPT | Performed by: FAMILY MEDICINE

## 2019-03-20 ASSESSMENT — ENCOUNTER SYMPTOMS
FOCAL WEAKNESS: 0
SHORTNESS OF BREATH: 0
FEVER: 0
SORE THROAT: 0
SENSORY CHANGE: 0

## 2019-03-20 NOTE — PROGRESS NOTES
"Subjective:     Francesco Rodriguez is a 53 y.o. male who presents for Foot Problem ((L) foot injury/Swelling and brusing.)    HPI  Pt presents for evaluation of left foot swelling   Pt missed a step while walking down stairs and twisted left ankle 6 days ago  Continued to walk the rest of the day as he was on vacation at the time  Had swelling and bruising after the injury   Pain was not extreme first day, however worsened the next few days  Pain is along the lateral aspect of foot and radiates into toes   No numbness   Does have occasional tingling in some of his toes   No repeat injuries since the initial one  No past injuries or problems with this foot    Review of Systems   Constitutional: Negative for fever.   HENT: Negative for sore throat.    Respiratory: Negative for shortness of breath.    Cardiovascular: Negative for chest pain.   Skin: Negative for rash.   Neurological: Negative for sensory change and focal weakness.     PMH: No chronic medical problems  MEDS: No daily meds  ALLERGIES: No Known Allergies  SURGHX: History reviewed. No pertinent surgical history.  SOCHX:  reports that he has never smoked. He has never used smokeless tobacco. He reports that he drinks about 8.4 oz of alcohol per week . He reports that he uses drugs, including Marijuana.  FH: Family history was reviewed, not contributing to acute injury     Objective:   /80 (BP Location: Left arm, Patient Position: Sitting, BP Cuff Size: Adult)   Pulse 76   Temp 36.5 °C (97.7 °F) (Temporal)   Resp 14   Ht 1.88 m (6' 2\")   Wt 99.8 kg (220 lb)   SpO2 98%   BMI 28.25 kg/m²     Physical Exam   Constitutional: He is oriented to person, place, and time. He appears well-developed and well-nourished. No distress.   HENT:   Head: Normocephalic and atraumatic.   Musculoskeletal:   Left ankle/foot:  Appearance - +Mild ecchymoses on dorsal midfoot, 2+ pitting edema throughout midfoot   Palpation - +TTP greatest at base of fifth metatarsal, with " mild tenderness to palpation along fifth metatarsal shaft.  No TTP along medial malleolus or deltoid ligament.  No TTP of lateral malleolus, ATFL, CFL, or PTFL.    Neurovascular - 2+ dorsalis pedis and posterior tibial.  Sensation intact and equal bilaterally   Neurological: He is alert and oriented to person, place, and time.   Skin: Skin is warm and dry. No rash noted. He is not diaphoretic.   Psychiatric: He has a normal mood and affect. His behavior is normal. Judgment and thought content normal.     Assessment/Plan:   Assessment    1. Closed displaced fracture of fifth metatarsal bone of left foot, initial encounter  Patient is a 53-year-old male with closed mildly displaced comminuted fracture of left fifth metatarsal.  Because fracture extends through zone 2 and because it is also comminuted, recommended surgical consultation to ensure better healing.  Patient still quite swollen today and cast/boot would be less desirable.  Patient placed in posterior splint and will be nonweightbearing with crutches until he sees orthopedist.  - REFERRAL TO ORTHOPEDICS

## 2019-11-12 ENCOUNTER — APPOINTMENT (RX ONLY)
Dept: URBAN - METROPOLITAN AREA CLINIC 35 | Facility: CLINIC | Age: 54
Setting detail: DERMATOLOGY
End: 2019-11-12

## 2019-11-12 DIAGNOSIS — L82.1 OTHER SEBORRHEIC KERATOSIS: ICD-10-CM

## 2019-11-12 DIAGNOSIS — L91.8 OTHER HYPERTROPHIC DISORDERS OF THE SKIN: ICD-10-CM

## 2019-11-12 DIAGNOSIS — D22 MELANOCYTIC NEVI: ICD-10-CM

## 2019-11-12 DIAGNOSIS — Z71.89 OTHER SPECIFIED COUNSELING: ICD-10-CM

## 2019-11-12 DIAGNOSIS — L81.4 OTHER MELANIN HYPERPIGMENTATION: ICD-10-CM

## 2019-11-12 PROBLEM — D22.5 MELANOCYTIC NEVI OF TRUNK: Status: ACTIVE | Noted: 2019-11-12

## 2019-11-12 PROCEDURE — ? BENIGN DESTRUCTION COSMETIC MULTI

## 2019-11-12 PROCEDURE — 99213 OFFICE O/P EST LOW 20 MIN: CPT

## 2019-11-12 PROCEDURE — ? COUNSELING

## 2019-11-12 ASSESSMENT — LOCATION DETAILED DESCRIPTION DERM
LOCATION DETAILED: LEFT POSTERIOR SHOULDER
LOCATION DETAILED: SUPERIOR THORACIC SPINE
LOCATION DETAILED: EPIGASTRIC SKIN
LOCATION DETAILED: RIGHT POSTERIOR SHOULDER
LOCATION DETAILED: RIGHT RIB CAGE
LOCATION DETAILED: INFERIOR THORACIC SPINE
LOCATION DETAILED: RIGHT AXILLARY VAULT

## 2019-11-12 ASSESSMENT — LOCATION SIMPLE DESCRIPTION DERM
LOCATION SIMPLE: UPPER BACK
LOCATION SIMPLE: RIGHT SHOULDER
LOCATION SIMPLE: ABDOMEN
LOCATION SIMPLE: RIGHT AXILLARY VAULT
LOCATION SIMPLE: LEFT SHOULDER

## 2019-11-12 ASSESSMENT — LOCATION ZONE DERM
LOCATION ZONE: AXILLAE
LOCATION ZONE: TRUNK
LOCATION ZONE: ARM

## 2019-11-12 NOTE — PROCEDURE: BENIGN DESTRUCTION COSMETIC MULTI
Detail Level: Simple
Consent: The patient's consent was obtained including but not limited to risks of crusting, scabbing, blistering, scarring, darker or lighter pigmentary change, recurrence, incomplete removal and infection.
Price (Use Numbers Only, No Special Characters Or $): 50.00
Anesthesia Volume In Cc: 0.2
Total Number Of Lesions Treated: 2
Post-Care Instructions: I reviewed with the patient in detail post-care instructions. Patient is to wear sunprotection, and avoid picking at any of the treated lesions. Pt may apply Vaseline to crusted or scabbing areas.

## 2021-08-13 ENCOUNTER — TELEPHONE (OUTPATIENT)
Dept: SCHEDULING | Facility: IMAGING CENTER | Age: 56
End: 2021-08-13

## 2021-08-18 NOTE — TELEPHONE ENCOUNTER
Patient seen within [facility], no records need to be obtained.

## 2021-08-26 ENCOUNTER — OFFICE VISIT (OUTPATIENT)
Dept: MEDICAL GROUP | Facility: LAB | Age: 56
End: 2021-08-26
Payer: COMMERCIAL

## 2021-08-26 VITALS
OXYGEN SATURATION: 97 % | BODY MASS INDEX: 27.72 KG/M2 | TEMPERATURE: 98 F | SYSTOLIC BLOOD PRESSURE: 110 MMHG | DIASTOLIC BLOOD PRESSURE: 70 MMHG | HEIGHT: 74 IN | RESPIRATION RATE: 12 BRPM | HEART RATE: 78 BPM | WEIGHT: 216 LBS

## 2021-08-26 DIAGNOSIS — Z00.00 WELL ADULT EXAM: ICD-10-CM

## 2021-08-26 DIAGNOSIS — E78.00 HYPERCHOLESTEROLEMIA: ICD-10-CM

## 2021-08-26 PROCEDURE — 99396 PREV VISIT EST AGE 40-64: CPT | Performed by: FAMILY MEDICINE

## 2021-08-26 RX ORDER — CHLORAL HYDRATE 500 MG
1000 CAPSULE ORAL
COMMUNITY
End: 2023-11-10

## 2021-08-26 RX ORDER — CETIRIZINE HYDROCHLORIDE 10 MG/1
10 TABLET ORAL DAILY
COMMUNITY

## 2021-08-26 ASSESSMENT — PATIENT HEALTH QUESTIONNAIRE - PHQ9: CLINICAL INTERPRETATION OF PHQ2 SCORE: 0

## 2021-08-26 NOTE — PROGRESS NOTES
"Francesco Rodriguez is a 55 y.o. male here to establish care. No acute concerns.    He reports a history of high cholesterol and was previously on a statin.  However, this significantly improved with change in diet and exercise and he stopped this.  He recently did whole 30 diet and is still sticking with most of this plan.  No regular exercise at this time.  Rare alcohol, non-smoker.  Had normal colonoscopy in 2018.  Vaccinations up-to-date besides shingles.  He does think he is due for labs.    Current medicines (including changes today)  Current Outpatient Medications   Medication Sig Dispense Refill   • Omega-3 Fatty Acids (FISH OIL) 1000 MG Cap capsule Take 1,000 mg by mouth 3 times a day with meals.     • cetirizine (ZYRTEC) 10 MG Tab Take 10 mg by mouth every day.       No current facility-administered medications for this visit.     He  has a past medical history of Allergy.  He  has no past surgical history on file.  Social History     Tobacco Use   • Smoking status: Never Smoker   • Smokeless tobacco: Never Used   Vaping Use   • Vaping Use: Never used   Substance Use Topics   • Alcohol use: Yes     Alcohol/week: 8.4 oz     Types: 9 Cans of beer, 5 Shots of liquor per week     Comment: socially   • Drug use: Yes     Types: Marijuana     Comment: \"less than once a week\"     Social History     Social History Narrative   • Not on file     Family History   Problem Relation Age of Onset   • Heart Disease Mother 60        CAD s/p cardiac bypass   • Heart Disease Father 65        CAD s/p cardiac bypass   • Hypertension Brother    • Hyperlipidemia Brother    • Diabetes Neg Hx    • Stroke Neg Hx      Family Status   Relation Name Status   • Mo     • Fa  Alive   • Bro  Alive   • MGMo     • MGFa     • PGMo     • PGFa     • Bro  Alive   • Neg Hx  (Not Specified)       ROS  See HPI     Objective:     Physical Exam:  /70 (BP Location: Right arm, Patient Position: Sitting, BP Cuff " "Size: Adult)   Pulse 78   Temp 36.7 °C (98 °F)   Resp 12   Ht 1.88 m (6' 2\")   Wt 98 kg (216 lb)   SpO2 97%  Body mass index is 27.73 kg/m².  Constitutional: Alert. Well appearing. No distress.  Skin: Warm, dry, good turgor, no visible rashes.  Eye: Equal, round and reactive to light, conjunctiva clear, lids normal.  ENMT: Moist mucous membranes.   Neck: Trachea midline, no masses, no thyromegaly.   Respiratory: Normal effort. Lungs are clear to auscultation bilaterally.  Cardiovascular: Regular rate and rhythm. Normal S1/S2. No murmurs, rubs or gallops.   Abdomen: Soft, non-tender, non-distended. No masses, no hepatosplenomegaly.  Neuro: Moves all four extremities. No facial droop.  Psych: Answers questions appropriately. Normal affect and mood.    Assessment and Plan:     1. Well adult exam  Health maintenance reviewed and updated.  Age-appropriate anticipatory guidance provided.  Encouraged to get shingles vaccine.  Labs as below.  - CBC WITH DIFFERENTIAL; Future  - Comp Metabolic Panel; Future  - Lipid Profile; Future    2. Hypercholesterolemia  Previously on statin but cholesterol decreased with diet and lifestyle changes and stopped statin.  We will recheck lipids.  - Lipid Profile; Future    Follow up: No follow-ups on file.         PLEASE NOTE: This note was created using voice recognition software.   "

## 2021-12-22 ENCOUNTER — APPOINTMENT (RX ONLY)
Dept: URBAN - METROPOLITAN AREA CLINIC 35 | Facility: CLINIC | Age: 56
Setting detail: DERMATOLOGY
End: 2021-12-22

## 2021-12-22 DIAGNOSIS — L82.1 OTHER SEBORRHEIC KERATOSIS: ICD-10-CM

## 2021-12-22 DIAGNOSIS — Z71.89 OTHER SPECIFIED COUNSELING: ICD-10-CM

## 2021-12-22 DIAGNOSIS — D22 MELANOCYTIC NEVI: ICD-10-CM

## 2021-12-22 DIAGNOSIS — D485 NEOPLASM OF UNCERTAIN BEHAVIOR OF SKIN: ICD-10-CM

## 2021-12-22 DIAGNOSIS — L81.4 OTHER MELANIN HYPERPIGMENTATION: ICD-10-CM

## 2021-12-22 PROBLEM — D48.5 NEOPLASM OF UNCERTAIN BEHAVIOR OF SKIN: Status: ACTIVE | Noted: 2021-12-22

## 2021-12-22 PROBLEM — D22.5 MELANOCYTIC NEVI OF TRUNK: Status: ACTIVE | Noted: 2021-12-22

## 2021-12-22 PROCEDURE — ? COUNSELING

## 2021-12-22 PROCEDURE — 11102 TANGNTL BX SKIN SINGLE LES: CPT

## 2021-12-22 PROCEDURE — 99213 OFFICE O/P EST LOW 20 MIN: CPT | Mod: 25

## 2021-12-22 PROCEDURE — ? BIOPSY BY SHAVE METHOD

## 2021-12-22 ASSESSMENT — LOCATION SIMPLE DESCRIPTION DERM
LOCATION SIMPLE: RIGHT SHOULDER
LOCATION SIMPLE: LEFT SHOULDER
LOCATION SIMPLE: UPPER BACK
LOCATION SIMPLE: ABDOMEN
LOCATION SIMPLE: LEFT FOREHEAD

## 2021-12-22 ASSESSMENT — LOCATION DETAILED DESCRIPTION DERM
LOCATION DETAILED: INFERIOR THORACIC SPINE
LOCATION DETAILED: LEFT POSTERIOR SHOULDER
LOCATION DETAILED: EPIGASTRIC SKIN
LOCATION DETAILED: LEFT FOREHEAD
LOCATION DETAILED: RIGHT POSTERIOR SHOULDER
LOCATION DETAILED: SUPERIOR THORACIC SPINE

## 2021-12-22 ASSESSMENT — LOCATION ZONE DERM
LOCATION ZONE: ARM
LOCATION ZONE: TRUNK
LOCATION ZONE: FACE

## 2021-12-22 NOTE — PROCEDURE: BIOPSY BY SHAVE METHOD
Detail Level: Detailed
Depth Of Biopsy: dermis
Was A Bandage Applied: Yes
Size Of Lesion In Cm: 1.2
X Size Of Lesion In Cm: 1.5
Biopsy Type: H and E
Biopsy Method: double edge Personna blade
Anesthesia Type: 0.5% lidocaine with 1:200,000 epinephrine and a 1:10 solution of 8.4% sodium bicarbonate
Anesthesia Volume In Cc: 0.5
Additional Anesthesia Volume In Cc (Will Not Render If 0): 0
Hemostasis: Aluminum Chloride
Wound Care: Petrolatum
Dressing: bandage
Destruction After The Procedure: No
Type Of Destruction Used: Curettage
Curettage Text: The wound bed was treated with curettage after the biopsy was performed.
Cryotherapy Text: The wound bed was treated with cryotherapy after the biopsy was performed.
Electrodesiccation Text: The wound bed was treated with electrodesiccation after the biopsy was performed.
Electrodesiccation And Curettage Text: The wound bed was treated with electrodesiccation and curettage after the biopsy was performed.
Silver Nitrate Text: The wound bed was treated with silver nitrate after the biopsy was performed.
Lab: [id number]
Lab Facility: 
Consent: Written consent was obtained and risks were reviewed including but not limited to scarring, infection, bleeding, scabbing, incomplete removal, nerve damage and allergy to anesthesia.
Post-Care Instructions: I reviewed with the patient in detail post-care instructions. Keep the biopsy site dry overnight, and then change the dressing once daily and apply a thin layer of petrolatum with a clean q-tip. \\nShowers are OK after 24 hours.  Allow clean water to run over the area.  Do not touch the biopsy site with your hands.  Do not immerse the biopsy site in water such as going into a swimming pool or bathtub until the sutures are removed.  If the biopsy site gets more painful with time, red, or drains, this is concerning for infection.  If you have signs of infection please call the office to come in for a walk in visit Monday through Friday 8:30 am to 12 pm or 1 pm to 4:30 pm.  If we are not in the office, please call through the answering service.
Notification Instructions: Patient will be notified of biopsy results. However, patient instructed to call the office if not contacted within 2 weeks.
Billing Type: Third-Party Bill
Information: Selecting Yes will display possible errors in your note based on the variables you have selected. This validation is only offered as a suggestion for you. PLEASE NOTE THAT THE VALIDATION TEXT WILL BE REMOVED WHEN YOU FINALIZE YOUR NOTE. IF YOU WANT TO FAX A PRELIMINARY NOTE YOU WILL NEED TO TOGGLE THIS TO 'NO' IF YOU DO NOT WANT IT IN YOUR FAXED NOTE.

## 2022-01-05 ENCOUNTER — APPOINTMENT (RX ONLY)
Dept: URBAN - METROPOLITAN AREA CLINIC 35 | Facility: CLINIC | Age: 57
Setting detail: DERMATOLOGY
End: 2022-01-05

## 2022-01-05 DIAGNOSIS — L57.0 ACTINIC KERATOSIS: ICD-10-CM

## 2022-01-05 PROCEDURE — ? LIQUID NITROGEN

## 2022-01-05 PROCEDURE — ? DIAGNOSIS COMMENT

## 2022-01-05 PROCEDURE — 17000 DESTRUCT PREMALG LESION: CPT

## 2022-01-05 ASSESSMENT — LOCATION DETAILED DESCRIPTION DERM: LOCATION DETAILED: LEFT FOREHEAD

## 2022-01-05 ASSESSMENT — LOCATION SIMPLE DESCRIPTION DERM: LOCATION SIMPLE: LEFT FOREHEAD

## 2022-01-05 ASSESSMENT — LOCATION ZONE DERM: LOCATION ZONE: FACE

## 2022-01-05 NOTE — PROCEDURE: LIQUID NITROGEN
Consent: The patient's consent was obtained including but not limited to risks of crusting, scabbing, blistering, scarring, darker or lighter pigmentary change, recurrence, incomplete removal and infection.
Post-Care Instructions: I reviewed with the patient in detail post-care instructions. Patient is to wear sunprotection, and avoid picking at any of the treated lesions. Pt may apply Vaseline to crusted or scabbing areas.
Detail Level: Detailed
Duration Of Freeze Thaw-Cycle (Seconds): 10
Show Aperture Variable?: Yes
Number Of Freeze-Thaw Cycles: 1 freeze-thaw cycle
Render Post-Care Instructions In Note?: no

## 2022-09-23 ENCOUNTER — APPOINTMENT (RX ONLY)
Dept: URBAN - METROPOLITAN AREA CLINIC 20 | Facility: CLINIC | Age: 57
Setting detail: DERMATOLOGY
End: 2022-09-23

## 2022-09-23 DIAGNOSIS — L91.8 OTHER HYPERTROPHIC DISORDERS OF THE SKIN: ICD-10-CM

## 2022-09-23 DIAGNOSIS — L82.1 OTHER SEBORRHEIC KERATOSIS: ICD-10-CM

## 2022-09-23 PROCEDURE — ? SKIN TAG REMOVAL (COSMETIC)

## 2022-09-23 PROCEDURE — ? LIQUID NITROGEN (COSMETIC)

## 2022-09-23 ASSESSMENT — LOCATION SIMPLE DESCRIPTION DERM
LOCATION SIMPLE: ABDOMEN
LOCATION SIMPLE: POSTERIOR NECK
LOCATION SIMPLE: RIGHT EAR
LOCATION SIMPLE: RIGHT SHOULDER
LOCATION SIMPLE: LEFT TEMPLE
LOCATION SIMPLE: RIGHT LOWER BACK
LOCATION SIMPLE: RIGHT UPPER BACK
LOCATION SIMPLE: LEFT UPPER BACK
LOCATION SIMPLE: HAIR
LOCATION SIMPLE: LEFT SHOULDER
LOCATION SIMPLE: LEFT FOREHEAD
LOCATION SIMPLE: RIGHT ANTERIOR NECK
LOCATION SIMPLE: LEFT ANTERIOR NECK
LOCATION SIMPLE: LEFT LOWER BACK

## 2022-09-23 ASSESSMENT — LOCATION ZONE DERM
LOCATION ZONE: ARM
LOCATION ZONE: SCALP
LOCATION ZONE: EAR
LOCATION ZONE: FACE
LOCATION ZONE: TRUNK
LOCATION ZONE: NECK

## 2022-09-23 ASSESSMENT — LOCATION DETAILED DESCRIPTION DERM
LOCATION DETAILED: LEFT LATERAL UPPER BACK
LOCATION DETAILED: RIGHT MID-UPPER BACK
LOCATION DETAILED: LEFT INFERIOR FOREHEAD
LOCATION DETAILED: HAIR
LOCATION DETAILED: LEFT INFERIOR TEMPLE
LOCATION DETAILED: LEFT SUPERIOR LATERAL LOWER BACK
LOCATION DETAILED: EPIGASTRIC SKIN
LOCATION DETAILED: LEFT POSTERIOR SHOULDER
LOCATION DETAILED: RIGHT SUPERIOR UPPER BACK
LOCATION DETAILED: RIGHT INFERIOR MEDIAL MIDBACK
LOCATION DETAILED: LEFT SUPERIOR MEDIAL UPPER BACK
LOCATION DETAILED: LEFT SUPERIOR LATERAL MIDBACK
LOCATION DETAILED: LEFT MEDIAL TRAPEZIAL NECK
LOCATION DETAILED: RIGHT INFERIOR LATERAL MIDBACK
LOCATION DETAILED: RIGHT INFERIOR ANTERIOR NECK
LOCATION DETAILED: LEFT INFERIOR UPPER BACK
LOCATION DETAILED: RIGHT POSTERIOR SHOULDER
LOCATION DETAILED: RIGHT CLAVICULAR NECK
LOCATION DETAILED: LEFT CLAVICULAR NECK
LOCATION DETAILED: LEFT SUPERIOR ANTERIOR NECK
LOCATION DETAILED: RIGHT SUPERIOR HELIX
LOCATION DETAILED: RIGHT INFERIOR UPPER BACK

## 2022-09-23 NOTE — PROCEDURE: SKIN TAG REMOVAL (COSMETIC)
Anesthesia Volume In Cc: 0
Consent: Written consent obtained and the risks of skin tag removal was reviewed with the patient including but not limited to bleeding, pigmentary change, infection, pain, and remote possibility of scarring.
Price (Use Numbers Only, No Special Characters Or $): 100
Removed With: gradle excision
Detail Level: Detailed

## 2022-09-23 NOTE — PROCEDURE: LIQUID NITROGEN (COSMETIC)
Spray Paint Text: The liquid nitrogen was applied to the skin utilizing a spray paint frosting technique.
Consent: The patient's consent was obtained including but not limited to risks of crusting, scabbing, blistering, scarring, darker or lighter pigmentary change, recurrence, incomplete removal and infection. The patient understands that the procedure is cosmetic in nature and is not covered by insurance.
Billing Information: Bill by Static Price
Price (Use Numbers Only, No Special Characters Or $): 100
Spray Paint Technique: No
Post-Care Instructions: I reviewed with the patient in detail post-care instructions. Patient is to wear sunprotection, and avoid picking at any of the treated lesions. Pt may apply Vaseline to crusted or scabbing areas.
Show Spray Paint Technique Variable?: Yes
Detail Level: Detailed

## 2022-11-16 ENCOUNTER — APPOINTMENT (RX ONLY)
Dept: URBAN - METROPOLITAN AREA CLINIC 35 | Facility: CLINIC | Age: 57
Setting detail: DERMATOLOGY
End: 2022-11-16

## 2022-11-16 DIAGNOSIS — Z71.89 OTHER SPECIFIED COUNSELING: ICD-10-CM

## 2022-11-16 DIAGNOSIS — L81.4 OTHER MELANIN HYPERPIGMENTATION: ICD-10-CM

## 2022-11-16 DIAGNOSIS — D22 MELANOCYTIC NEVI: ICD-10-CM

## 2022-11-16 DIAGNOSIS — L82.1 OTHER SEBORRHEIC KERATOSIS: ICD-10-CM

## 2022-11-16 PROBLEM — D22.5 MELANOCYTIC NEVI OF TRUNK: Status: ACTIVE | Noted: 2022-11-16

## 2022-11-16 PROCEDURE — 99213 OFFICE O/P EST LOW 20 MIN: CPT

## 2022-11-16 PROCEDURE — ? COUNSELING

## 2022-11-16 ASSESSMENT — LOCATION SIMPLE DESCRIPTION DERM
LOCATION SIMPLE: RIGHT SHOULDER
LOCATION SIMPLE: UPPER BACK
LOCATION SIMPLE: LEFT SHOULDER
LOCATION SIMPLE: ABDOMEN

## 2022-11-16 ASSESSMENT — LOCATION DETAILED DESCRIPTION DERM
LOCATION DETAILED: LEFT POSTERIOR SHOULDER
LOCATION DETAILED: EPIGASTRIC SKIN
LOCATION DETAILED: INFERIOR THORACIC SPINE
LOCATION DETAILED: RIGHT POSTERIOR SHOULDER
LOCATION DETAILED: SUPERIOR THORACIC SPINE

## 2022-11-16 ASSESSMENT — LOCATION ZONE DERM
LOCATION ZONE: ARM
LOCATION ZONE: TRUNK

## 2023-03-15 ENCOUNTER — HOSPITAL ENCOUNTER (OUTPATIENT)
Dept: RADIOLOGY | Facility: MEDICAL CENTER | Age: 58
End: 2023-03-15
Attending: FAMILY MEDICINE
Payer: COMMERCIAL

## 2023-03-15 ENCOUNTER — OFFICE VISIT (OUTPATIENT)
Dept: MEDICAL GROUP | Facility: LAB | Age: 58
End: 2023-03-15
Payer: COMMERCIAL

## 2023-03-15 VITALS
HEART RATE: 80 BPM | SYSTOLIC BLOOD PRESSURE: 136 MMHG | HEIGHT: 74 IN | OXYGEN SATURATION: 96 % | DIASTOLIC BLOOD PRESSURE: 82 MMHG | WEIGHT: 237 LBS | TEMPERATURE: 97.1 F | BODY MASS INDEX: 30.42 KG/M2 | RESPIRATION RATE: 12 BRPM

## 2023-03-15 DIAGNOSIS — M25.40 JOINT SWELLING: ICD-10-CM

## 2023-03-15 DIAGNOSIS — M25.649 MORNING JOINT STIFFNESS OF HAND, UNSPECIFIED LATERALITY: ICD-10-CM

## 2023-03-15 DIAGNOSIS — Z00.00 WELL ADULT EXAM: ICD-10-CM

## 2023-03-15 PROCEDURE — 99214 OFFICE O/P EST MOD 30 MIN: CPT | Performed by: FAMILY MEDICINE

## 2023-03-15 PROCEDURE — 77077 JOINT SURVEY SINGLE VIEW: CPT

## 2023-03-15 RX ORDER — VITAMIN E 268 MG
400 CAPSULE ORAL DAILY
COMMUNITY

## 2023-03-15 ASSESSMENT — PATIENT HEALTH QUESTIONNAIRE - PHQ9: CLINICAL INTERPRETATION OF PHQ2 SCORE: 0

## 2023-03-15 NOTE — PROGRESS NOTES
"Subjective:     CC: Hand pain and stiffness.    HPI:   Francesco presents today with concern for bilateral hand pain and morning stiffness.  He is requesting referral to see hand surgery.  This has been going on for some time but slowly worsening.  Decreased  strength that seems especially worse in the morning and symptoms will improve throughout the day.  Swelling over multiple joints of both hands including DIPs and PIPs.  Also has noted some palpable cords to the palm.  Brother has RA, he has never had work-up for this.    Medications, past medical history, allergies, and social history have been reviewed and updated.      Objective:       Exam:  /82 (BP Location: Right arm, Patient Position: Sitting, BP Cuff Size: Adult)   Pulse 80   Temp 36.2 °C (97.1 °F)   Resp 12   Ht 1.88 m (6' 2\")   Wt 108 kg (237 lb)   SpO2 96%   BMI 30.43 kg/m²  Body mass index is 30.43 kg/m².    Constitutional: Alert. Well appearing. No distress.  Skin: Warm, dry, good turgor, no visible rashes.  Eye: Equal, round and reactive to light, conjunctiva clear, lids normal.  MSK: Decreased  strength to both hands.  Possible cords to the palmar surface of the right hand most notably over the thenar compartment.  There are multiple areas of nodular edema over DIP and PIP joints of both hands, L>R.  Minimal current overlying joint tenderness or erythema.  Neuro: Moves all four extremities. No facial droop.  Psych: Answers questions appropriately. Normal affect and mood.    Assessment & Plan:     57 y.o. male with the following -     1. Morning joint stiffness of hand, unspecified laterality  2. Joint swelling  Presents with persistent and worsening stiffness, weakness, and joint swelling/nodules to both hands.  Family history of RA.  Right hand does have some palpable cords suggestive of Dupuytren's. However, morning stiffness, weakness, and nodules concerning for RA or other inflammatory arthritis.  Labs and joint survey as " below.  Consider referral to rheumatology or hand surgery depending on these results.  - STANFORD REFLEXIVE PROFILE; Future  - RHEUMATOID ARTHRITIS FACTOR; Future  - CCP ANTIBODY; Future  - Sed Rate; Future  - CRP QUANTITIVE (NON-CARDIAC); Future  - DX-JOINT SURVEY-HANDS SINGLE VIEW; Future    3. Well adult exam  - CBC WITH DIFFERENTIAL; Future  - Comp Metabolic Panel; Future  - Lipid Profile; Future      Please note that this note was created using voice recognition software.

## 2023-03-21 ENCOUNTER — HOSPITAL ENCOUNTER (OUTPATIENT)
Dept: LAB | Facility: MEDICAL CENTER | Age: 58
End: 2023-03-21
Attending: FAMILY MEDICINE
Payer: COMMERCIAL

## 2023-03-21 DIAGNOSIS — M25.649 MORNING JOINT STIFFNESS OF HAND, UNSPECIFIED LATERALITY: ICD-10-CM

## 2023-03-21 DIAGNOSIS — Z00.00 WELL ADULT EXAM: ICD-10-CM

## 2023-03-21 DIAGNOSIS — M25.40 JOINT SWELLING: ICD-10-CM

## 2023-03-21 LAB
ALBUMIN SERPL BCP-MCNC: 4.3 G/DL (ref 3.2–4.9)
ALBUMIN/GLOB SERPL: 1.8 G/DL
ALP SERPL-CCNC: 52 U/L (ref 30–99)
ALT SERPL-CCNC: 21 U/L (ref 2–50)
ANION GAP SERPL CALC-SCNC: 13 MMOL/L (ref 7–16)
AST SERPL-CCNC: 17 U/L (ref 12–45)
BASOPHILS # BLD AUTO: 0.8 % (ref 0–1.8)
BASOPHILS # BLD: 0.06 K/UL (ref 0–0.12)
BILIRUB SERPL-MCNC: 0.5 MG/DL (ref 0.1–1.5)
BUN SERPL-MCNC: 16 MG/DL (ref 8–22)
CALCIUM ALBUM COR SERPL-MCNC: 8.8 MG/DL (ref 8.5–10.5)
CALCIUM SERPL-MCNC: 9 MG/DL (ref 8.5–10.5)
CHLORIDE SERPL-SCNC: 102 MMOL/L (ref 96–112)
CHOLEST SERPL-MCNC: 451 MG/DL (ref 100–199)
CO2 SERPL-SCNC: 24 MMOL/L (ref 20–33)
CREAT SERPL-MCNC: 0.87 MG/DL (ref 0.5–1.4)
CRP SERPL HS-MCNC: <0.3 MG/DL (ref 0–0.75)
EOSINOPHIL # BLD AUTO: 0.31 K/UL (ref 0–0.51)
EOSINOPHIL NFR BLD: 4.1 % (ref 0–6.9)
ERYTHROCYTE [DISTWIDTH] IN BLOOD BY AUTOMATED COUNT: 39 FL (ref 35.9–50)
ERYTHROCYTE [SEDIMENTATION RATE] IN BLOOD BY WESTERGREN METHOD: 8 MM/HOUR (ref 0–20)
FASTING STATUS PATIENT QL REPORTED: NORMAL
GFR SERPLBLD CREATININE-BSD FMLA CKD-EPI: 102 ML/MIN/1.73 M 2
GLOBULIN SER CALC-MCNC: 2.4 G/DL (ref 1.9–3.5)
GLUCOSE SERPL-MCNC: 94 MG/DL (ref 65–99)
HCT VFR BLD AUTO: 39.2 % (ref 42–52)
HDLC SERPL-MCNC: ABNORMAL MG/DL
HGB BLD-MCNC: 14.5 G/DL (ref 14–18)
IMM GRANULOCYTES # BLD AUTO: 0.02 K/UL (ref 0–0.11)
IMM GRANULOCYTES NFR BLD AUTO: 0.3 % (ref 0–0.9)
LDLC SERPL CALC-MCNC: ABNORMAL MG/DL
LYMPHOCYTES # BLD AUTO: 1.87 K/UL (ref 1–4.8)
LYMPHOCYTES NFR BLD: 24.7 % (ref 22–41)
MCH RBC QN AUTO: 32.4 PG (ref 27–33)
MCHC RBC AUTO-ENTMCNC: 37 G/DL (ref 33.7–35.3)
MCV RBC AUTO: 87.7 FL (ref 81.4–97.8)
MONOCYTES # BLD AUTO: 0.67 K/UL (ref 0–0.85)
MONOCYTES NFR BLD AUTO: 8.8 % (ref 0–13.4)
NEUTROPHILS # BLD AUTO: 4.65 K/UL (ref 1.82–7.42)
NEUTROPHILS NFR BLD: 61.3 % (ref 44–72)
NRBC # BLD AUTO: 0 K/UL
NRBC BLD-RTO: 0 /100 WBC
PLATELET # BLD AUTO: 201 K/UL (ref 164–446)
PMV BLD AUTO: 10.6 FL (ref 9–12.9)
POTASSIUM SERPL-SCNC: 3.9 MMOL/L (ref 3.6–5.5)
PROT SERPL-MCNC: 6.7 G/DL (ref 6–8.2)
RBC # BLD AUTO: 4.47 M/UL (ref 4.7–6.1)
RHEUMATOID FACT SER IA-ACNC: 12 IU/ML (ref 0–14)
SODIUM SERPL-SCNC: 139 MMOL/L (ref 135–145)
TRIGL SERPL-MCNC: 2220 MG/DL (ref 0–149)
WBC # BLD AUTO: 7.6 K/UL (ref 4.8–10.8)

## 2023-03-21 PROCEDURE — 86431 RHEUMATOID FACTOR QUANT: CPT

## 2023-03-21 PROCEDURE — 36415 COLL VENOUS BLD VENIPUNCTURE: CPT

## 2023-03-21 PROCEDURE — 86200 CCP ANTIBODY: CPT

## 2023-03-21 PROCEDURE — 86038 ANTINUCLEAR ANTIBODIES: CPT

## 2023-03-21 PROCEDURE — 80061 LIPID PANEL: CPT

## 2023-03-21 PROCEDURE — 85025 COMPLETE CBC W/AUTO DIFF WBC: CPT

## 2023-03-21 PROCEDURE — 86140 C-REACTIVE PROTEIN: CPT

## 2023-03-21 PROCEDURE — 85652 RBC SED RATE AUTOMATED: CPT

## 2023-03-21 PROCEDURE — 80053 COMPREHEN METABOLIC PANEL: CPT

## 2023-03-22 LAB — NUCLEAR IGG SER QL IA: NORMAL

## 2023-03-23 LAB — CCP IGG SERPL-ACNC: 2 UNITS (ref 0–19)

## 2023-03-29 ENCOUNTER — OFFICE VISIT (OUTPATIENT)
Dept: MEDICAL GROUP | Facility: LAB | Age: 58
End: 2023-03-29
Payer: COMMERCIAL

## 2023-03-29 VITALS
RESPIRATION RATE: 12 BRPM | DIASTOLIC BLOOD PRESSURE: 80 MMHG | OXYGEN SATURATION: 96 % | HEART RATE: 76 BPM | WEIGHT: 239.6 LBS | SYSTOLIC BLOOD PRESSURE: 130 MMHG | TEMPERATURE: 97.2 F | BODY MASS INDEX: 30.75 KG/M2 | HEIGHT: 74 IN

## 2023-03-29 DIAGNOSIS — E75.5: ICD-10-CM

## 2023-03-29 DIAGNOSIS — E78.1 HYPERTRIGLYCERIDEMIA, ESSENTIAL: ICD-10-CM

## 2023-03-29 PROCEDURE — 99214 OFFICE O/P EST MOD 30 MIN: CPT | Performed by: FAMILY MEDICINE

## 2023-03-29 ASSESSMENT — FIBROSIS 4 INDEX: FIB4 SCORE: 1.02

## 2023-03-29 NOTE — PATIENT INSTRUCTIONS
"The following lifestyle modifications apply in addition to the general measures above:    Diet - In this setting (particularly for those with prior acute pancreatitis), it is crucial to restrict dietary fat to ?10 to 15 percent (preferably <5 percent) of total calories with the goal of reducing the TG level to <1000 mg/dL    Patients should be reminded that even \"good fat\" such as vegetable oils and nuts, and fat contained in chips and pastries, can raise their TG levels and cause pancreatitis. At fasting TG levels >500 to 1000 mg/dL (5.6 to 11.3 mmol/L), the clearance of chylomicrons from the blood becomes slower, such that chylomicrons from the previous night's meal may still be present in morning fasting blood. This sets the stage for accumulation of chylomicron TG derived from dietary fat, leading to a risk of pancreatitis and other manifestations of fasting chylomicronemia.     Alcohol use - No alcohol  "

## 2023-03-29 NOTE — PROGRESS NOTES
"Subjective:     CC: Follow up labs    HPI:   Francesco presents today with follow-up on lab results notable for triglycerides 2220, .  Unable to calculate HDL or LDL.    Reports he was on Crestor in the past, he preferred to stop and does report that he saw significant improvement in triglyceride numbers through strict diet and exercise changes.    Reports no family history of severe hypercholesterolemia or hypertriglyceridemia.  No history of pancreatitis.    Diet has worsened again recently and he has not been as strict as he once was.    Medications, past medical history, allergies, and social history have been reviewed and updated.      Objective:       Exam:  /80 (BP Location: Right arm, Patient Position: Sitting, BP Cuff Size: Adult)   Pulse 76   Temp 36.2 °C (97.2 °F)   Resp 12   Ht 1.88 m (6' 2\")   Wt 109 kg (239 lb 9.6 oz)   SpO2 96%   BMI 30.76 kg/m²  Body mass index is 30.76 kg/m².    Constitutional: Alert. Well appearing. No distress.  Skin: Warm, dry, good turgor, no visible rashes.  Eye: Equal, round and reactive to light, conjunctiva clear, lids normal.  Respiratory: Normal effort.   Neuro: Moves all four extremities. No facial droop.  MSK There are multiple areas of nodular edema over DIP and PIP joints of both hands, L>R.  Psych: Answers questions appropriately. Normal affect and mood.      Assessment & Plan:     57 y.o. male with the following -     1. Hypertriglyceridemia, essential  Severe hypertriglyceridemia with TG 2220.  He is given handout and instructions on strict dietary measures to help lower this including <15% dietary fat and avoiding alcohol.  Consider statin or fibrate but will hold for now and plan to start when fasting TG level less than 1000.  Referral is sent to lipid clinic for further treatment recommendations and any additional work-up, repeat labs as below.  - HEMOGLOBIN A1C; Future  - TSH WITH REFLEX TO FT4; Future  - Lipid Profile; Future  - Referral to " Vascular Medicine    2. Tendinous xanthoma  Multiple nodules overlying tendons of the hands.  Suspect likely tendinous xanthomas given his lipid numbers.  No specific treatment for now.    Please note that this note was created using voice recognition software.

## 2023-04-10 ENCOUNTER — DOCUMENTATION (OUTPATIENT)
Dept: VASCULAR LAB | Facility: MEDICAL CENTER | Age: 58
End: 2023-04-10
Payer: COMMERCIAL

## 2023-05-03 ENCOUNTER — HOSPITAL ENCOUNTER (OUTPATIENT)
Dept: LAB | Facility: MEDICAL CENTER | Age: 58
End: 2023-05-03
Attending: FAMILY MEDICINE
Payer: COMMERCIAL

## 2023-05-03 DIAGNOSIS — E78.1 HYPERTRIGLYCERIDEMIA, ESSENTIAL: ICD-10-CM

## 2023-05-03 LAB
CHOLEST SERPL-MCNC: 239 MG/DL (ref 100–199)
EST. AVERAGE GLUCOSE BLD GHB EST-MCNC: 97 MG/DL
FASTING STATUS PATIENT QL REPORTED: NORMAL
HBA1C MFR BLD: 5 % (ref 4–5.6)
HDLC SERPL-MCNC: 40 MG/DL
LDLC SERPL CALC-MCNC: 145 MG/DL
TRIGL SERPL-MCNC: 272 MG/DL (ref 0–149)

## 2023-05-03 PROCEDURE — 84443 ASSAY THYROID STIM HORMONE: CPT

## 2023-05-03 PROCEDURE — 83036 HEMOGLOBIN GLYCOSYLATED A1C: CPT

## 2023-05-03 PROCEDURE — 36415 COLL VENOUS BLD VENIPUNCTURE: CPT

## 2023-05-03 PROCEDURE — 80061 LIPID PANEL: CPT

## 2023-05-04 LAB — TSH SERPL DL<=0.005 MIU/L-ACNC: 2.04 UIU/ML (ref 0.38–5.33)

## 2023-05-08 ENCOUNTER — OFFICE VISIT (OUTPATIENT)
Dept: VASCULAR LAB | Facility: MEDICAL CENTER | Age: 58
End: 2023-05-08
Attending: FAMILY MEDICINE
Payer: COMMERCIAL

## 2023-05-08 VITALS
DIASTOLIC BLOOD PRESSURE: 74 MMHG | SYSTOLIC BLOOD PRESSURE: 130 MMHG | WEIGHT: 235 LBS | HEIGHT: 74 IN | HEART RATE: 62 BPM | BODY MASS INDEX: 30.16 KG/M2

## 2023-05-08 DIAGNOSIS — E78.2 MIXED HYPERLIPIDEMIA: ICD-10-CM

## 2023-05-08 DIAGNOSIS — Z82.49 FAMILY HISTORY OF PREMATURE CAD: ICD-10-CM

## 2023-05-08 DIAGNOSIS — E66.09 CLASS 1 OBESITY DUE TO EXCESS CALORIES WITH SERIOUS COMORBIDITY AND BODY MASS INDEX (BMI) OF 30.0 TO 30.9 IN ADULT: ICD-10-CM

## 2023-05-08 DIAGNOSIS — R03.0 ELEVATED BLOOD PRESSURE READING: ICD-10-CM

## 2023-05-08 DIAGNOSIS — Z91.89 OTHER SPECIFIED PERSONAL RISK FACTORS, NOT ELSEWHERE CLASSIFIED: ICD-10-CM

## 2023-05-08 PROBLEM — E66.811 CLASS 1 OBESITY DUE TO EXCESS CALORIES WITH SERIOUS COMORBIDITY AND BODY MASS INDEX (BMI) OF 30.0 TO 30.9 IN ADULT: Status: ACTIVE | Noted: 2023-05-08

## 2023-05-08 PROCEDURE — 99204 OFFICE O/P NEW MOD 45 MIN: CPT | Performed by: FAMILY MEDICINE

## 2023-05-08 PROCEDURE — 99212 OFFICE O/P EST SF 10 MIN: CPT

## 2023-05-08 RX ORDER — ROSUVASTATIN CALCIUM 10 MG/1
10 TABLET, COATED ORAL EVERY EVENING
Qty: 90 TABLET | Refills: 3 | Status: SHIPPED | OUTPATIENT
Start: 2023-05-08 | End: 2023-07-06

## 2023-05-08 RX ORDER — CALCIUM CARBONATE/VITAMIN D3 600 MG-10
TABLET ORAL
COMMUNITY
End: 2024-03-05

## 2023-05-08 ASSESSMENT — ENCOUNTER SYMPTOMS
SPUTUM PRODUCTION: 0
HEMOPTYSIS: 0
CHILLS: 0
CLAUDICATION: 0
PALPITATIONS: 0
WHEEZING: 0
SHORTNESS OF BREATH: 0
ORTHOPNEA: 0
FEVER: 0
PND: 0
COUGH: 0

## 2023-05-08 ASSESSMENT — FIBROSIS 4 INDEX: FIB4 SCORE: 1.02

## 2023-05-08 NOTE — PROGRESS NOTES
[facility] - Initial Visit  05/08/23     Francesco Rodriguez has been referred for evaluation and management of dyslipidemia    Referral Source: Moiz Rincon*     Subjective   Initial visit hx:  severe HTG = 2,220 in 3/2023 prompting further eval and tx.  Ongoing mixed HLD despite current care.   No hx of acute pancreatitis  Change in weight:  increase over time   BMI Readings from Last 5 Encounters:   05/08/23 30.17 kg/m²   03/29/23 30.76 kg/m²   03/15/23 30.43 kg/m²   08/26/21 27.73 kg/m²   03/20/19 28.25 kg/m²     Exercise habits: minimal exercise  Dietary patterns: reduce sat fat, simple CHO  Etoh: No, reduced to practically none   Reported barriers to care: none   History of ASCVD: None  Secondary causes of dyslipidemia:  Endocrine/Hypothyroidism:  none reported   Liver disease: none reported   Renal disease/nephrotic syndrome:  none reported  Medications: None  Other Established (non-atherosclerotic) Vascular Disease, if Present: None  Age at Initial Diagnosis of Dyslipidemia: 48yo  Current Prescription Lipid Lowering Medications - including dose:   Statin: None  Non-Statin: None  Current Lipid Lowering and Related Supplements: cholest-off, omega 3 FAs  Any Current Side Effects Potentially Related to Lipid Lowering therapy? No  Current Adherence to Lipid Lowering Therapies: Complete  Previously Attempted Interventions for Lipids - including outcome  Statin: crestor   Outcome:  stopped 10 years ago - was expensive   Non-Statin: None  Outcome: N/A  Any Previous History of Statin Intolerance? No  Baseline Lipids Prior to Treatment:    Latest Reference Range & Units 03/21/23 08:04   Cholesterol,Tot 100 - 199 mg/dL 451 (H)   Triglycerides 0 - 149 mg/dL 2220 (H)   HDL >=40 mg/dL see below   LDL <100 mg/dL see below     Anticoagulation/antiplats: No, no hx of bleeding    HTN:  No prior dx or meds      T2D: No     PAST MEDICAL HISTORY:  has a past medical history of Allergy.    PAST SURGICAL  "HISTORY:  has no past surgical history on file.      Current Outpatient Medications:     rosuvastatin, 10 mg, Oral, Q EVENING    CholestOff,     vitamin e, 400 Units, Oral, DAILY    fish oil, 1,000 mg, Oral, TID WITH MEALS    cetirizine, 10 mg, Oral, DAILY    ALLERGIES: Patient has no known allergies.    Family History   Problem Relation Age of Onset    Heart Disease Mother 60        s/p CABG    Hyperlipidemia Mother     Heart Failure Mother         cause of death    Heart Disease Father 65        s/p CABG, mid-60s    Hyperlipidemia Father     Peripheral vascular disease Father         smoker, stenting    Heart Failure Father 79        cause of death?    COPD Father     Hypertension Brother     Hyperlipidemia Brother     No Known Problems Brother     No Known Problems Son     Diabetes Neg Hx     Stroke Neg Hx        Social History     Tobacco Use    Smoking status: Never    Smokeless tobacco: Never   Vaping Use    Vaping Use: Never used   Substance Use Topics    Alcohol use: Yes     Alcohol/week: 8.4 oz     Types: 9 Cans of beer, 5 Shots of liquor per week     Comment: socially    Drug use: Yes     Types: Marijuana     Comment: \"less than once a week\"       Review of Systems   Constitutional:  Negative for chills, fever and malaise/fatigue.   Respiratory:  Negative for cough, hemoptysis, sputum production, shortness of breath and wheezing.    Cardiovascular:  Negative for chest pain, palpitations, orthopnea, claudication, leg swelling and PND.       Objective    Vitals:    05/08/23 1356 05/08/23 1359   BP: 139/86 130/74   BP Location: Left arm Left arm   Patient Position: Sitting Sitting   BP Cuff Size: Large adult Large adult   Pulse: 62    Weight: 107 kg (235 lb)    Height: 1.88 m (6' 2\")       BP Readings from Last 5 Encounters:   05/08/23 130/74   03/29/23 130/80   03/15/23 136/82   08/26/21 110/70   03/20/19 120/80     Physical Exam  Constitutional:       Appearance: Normal appearance. He is well-developed. "   HENT:      Head: Normocephalic and atraumatic.   Eyes:      Conjunctiva/sclera: Conjunctivae normal.      Pupils: Pupils are equal, round, and reactive to light.      Comments: No corneal arcus   Neck:      Thyroid: No thyromegaly.      Vascular: No JVD.   Cardiovascular:      Rate and Rhythm: Normal rate and regular rhythm.      Pulses:           Radial pulses are 2+ on the right side and 2+ on the left side.        Dorsalis pedis pulses are 2+ on the right side and 2+ on the left side.        Posterior tibial pulses are 2+ on the right side and 2+ on the left side.      Heart sounds: Normal heart sounds. No murmur heard.    No friction rub. No gallop.   Pulmonary:      Effort: Pulmonary effort is normal. No respiratory distress.      Breath sounds: Normal breath sounds.   Musculoskeletal:      Cervical back: Normal range of motion and neck supple.      Comments: No achilles tendon thickening  No extensor surface xanthomas at dorsal hands, patellae, achilles.    Has multiple flexor surface tendon nodularity that appears to be dupuytren's contractures   Lymphadenopathy:      Cervical: No cervical adenopathy.   Skin:     General: Skin is warm and dry.      Comments: No periocular xanthelasma   Neurological:      General: No focal deficit present.      Mental Status: He is alert and oriented to person, place, and time.      Cranial Nerves: No cranial nerve deficit.       DATA REVIEW:  Most Recent Lipid Panel:   Lab Results   Component Value Date    CHOLSTRLTOT 239 (H) 05/03/2023    TRIGLYCERIDE 272 (H) 05/03/2023    HDL 40 05/03/2023     (H) 05/03/2023     No results found for: LIPOPROTA   No results found for: APOB      Other Pertinent Blood Work:   Lab Results   Component Value Date    SODIUM 139 03/21/2023    POTASSIUM 3.9 03/21/2023    CHLORIDE 102 03/21/2023    CO2 24 03/21/2023    ANION 13.0 03/21/2023    GLUCOSE 94 03/21/2023    BUN 16 03/21/2023    CREATININE 0.87 03/21/2023    CALCIUM 9.0 03/21/2023  "   ASTSGOT 17 03/21/2023    ALTSGPT 21 03/21/2023    ALKPHOSPHAT 52 03/21/2023    TBILIRUBIN 0.5 03/21/2023    ALBUMIN 4.3 03/21/2023    AGRATIO 1.8 03/21/2023    CREACTPROT <0.30 03/21/2023    TSHULTRASEN 2.040 05/03/2023     VASCULAR IMAGING:     No results found for this or any previous visit.        ASSESSMENT AND PLAN  1. Mixed hyperlipidemia  APO E GENOTYPING,CARDIO RISK    APOLIPOPROTEIN B    Comp Metabolic Panel    LDL, DIRECT    Lipid Profile    Lipoprotein (a)    CRP HIGH SENSITIVE (CARDIAC)    rosuvastatin (CRESTOR) 10 MG Tab      2. Elevated blood pressure reading        3. Other specified personal risk factors, not elsewhere classified  CT-CARDIAC SCORING      4. Family history of premature CAD  CRP HIGH SENSITIVE (CARDIAC)      5. Class 1 obesity due to excess calories with serious comorbidity and body mass index (BMI) of 30.0 to 30.9 in adult          Patient Type, check all that apply: Primary Prevention    Established Atherosclerotic Cardiovascular Disease (ASCVD): no    Other Established (non-atherosclerotic) Vascular Disease, if Present: None    Evidence of Heterozygous Familial Hypercholesterolemia (FH): Possible , prior TC = 451 (unable to calc LDL due to severe HTG)  Strong fhx of premature CAD (see fhx)     Presumed dx:  FCHL (aka \"multifactorial combined HLD\") based upon high LDL-C/high TG with type IIb phenotype   - most commonly inherited DLD (~1 in 200), generally polygenic often with significant famhx of DLD and premature ASCVD.  significant increased risk for premature CVD (>20% develop CHD <59yo) and requires aggressive lipid lowering interventions including diet and meds   - will track apoB moving forward       FH genotyping: recommended.    Ordered through [facility]:  Your order ID is [id number].    Benefits/limitations/risks for dyslipidemic genetic testing reviewed.  Further resources for review available at [email or web address] site    Indications and rationale for dyslipidemia genetic " "testing:  -Strong clinical suspicion of a genetic dyslipidemia.  - assist with definitive diagnosis and aid in treatment decision-making  -Strong family history of dyslipidemia or its complications.  -Presence of related syndromic features  -Evidence that testing might .  -Available and effective early interventions exist.  -Eligibility for new or investigational drugs.  -Patient preference.  -Family planning  - informing need for cascade screening of family members     Benefits include:   -Confirmation of a clinical diagnosis of FH, especially in cases where it is not clear whether the person has FH or not.  -Provides more information about one’s risk or diagnosis, since not all individuals with FH present the same.  -Often results in initiation and intensification of therapy by a healthcare provider. Studies have also shown that individuals with FH are more willing to start, intensify or continue taking prescribed medications when given genetic confirmation.  -Provides information regarding why a healthy lifestyle and diet have not been able to control cholesterol levels on their own.  -Helps other family members to be screened.  -Determines whether or not FH has been passed down to a child, since everyone with -FH has a 50% chance of doing so.    ACC/AHA Indication for Statin Therapy, senthil all that apply:  Primary Severe HLD / FH (baseline LDL-C >190)    Calculated Risk for ASCVD, if applicable    The 10-year ASCVD risk score (Brenda DK, et al., 2019) is: 8.5%,  7.5 - <20% \"intermediate risk\"     Other Significant Risk Markers, if any, senthil all that apply   Other: pending further w/u   - check lp(a), CACS     Risk-enhancers: Metabolic syndrome  and Persistently elevated trigs >174    Goal LDL-C and nonHDL-C based on Clinic Protocol  LDL-C <100 (if HTG, consider apoB <90, non-HDL-C <130)  At goal? no    LIFESTYLE INTERVENTIONS:    SMOKING:    reports that he has never smoked. He has never used " smokeless tobacco.   - continued complete avoidance of all tobacco products     PHYSICAL ACTIVITY: continue healthy activity to improve CV fitness.  In general, targeting >150min/week of moderate-level activity or as much as tolerated in light of functional status and co-morbidities     WEIGHT MANAGEMENT AND NUTRITION: Mediterranean style dietary approach , Triglyceride-lowering diet with reduced fat calories, reduced simple carbohydrates, reduce/avoid alcohol, and Provide specific dietary approach handouts      LIPID LOWERING MEDICATION MANAGEMENT:     Statin Therapy Recommendations from Today’s Visit:   - start rosuva 10mg daily    Non-Statin Medications Recommendations from Today’s Visit: none    Indication for PCSK9 Inhibitor strategy, if applicable: Not currently indicated    Supplements Recommended at this visit: ok to continue cholest-off     RECOMMENDATIONS FOR OTHER CV RISK FACTORS:    1) BLOOD PRESSURE MANAGEMENT:  ACC/AHA (2017) goal <130/80  Home BP at goal: yes  Office BP at goal:  yes   Plan:   - continue healthy diet, activity, weight mgmt   Monitoring:   - routine clinic-based BP measurements at least once annually   Medications: no meds indicated at this time      2) Glycemic Status: Normal    ANTITHROMBOTIC THERAPY None    OTHER ISSUES: none     Studies Ordered at Todays Visit: CACS    Blood Work Ordered At Today’s visit: as noted above   Follow-Up: 2 months    Kenney Zamora M.D.  ABCL, Board-certified clinical lipidologist   [facility]   [facility]   [phone]     CC:  Moiz Rincon M.D.  Moiz Rincon*

## 2023-05-10 DIAGNOSIS — R22.9 SUBCUTANEOUS NODULES: ICD-10-CM

## 2023-05-21 ENCOUNTER — HOSPITAL ENCOUNTER (OUTPATIENT)
Dept: RADIOLOGY | Facility: MEDICAL CENTER | Age: 58
End: 2023-05-21
Attending: FAMILY MEDICINE
Payer: COMMERCIAL

## 2023-05-21 DIAGNOSIS — Z91.89 OTHER SPECIFIED PERSONAL RISK FACTORS, NOT ELSEWHERE CLASSIFIED: ICD-10-CM

## 2023-05-21 PROCEDURE — 4410556 CT-CARDIAC SCORING (SELF PAY ONLY)

## 2023-05-25 ENCOUNTER — DOCUMENTATION (OUTPATIENT)
Dept: VASCULAR LAB | Facility: MEDICAL CENTER | Age: 58
End: 2023-05-25
Payer: COMMERCIAL

## 2023-05-25 NOTE — PROGRESS NOTES
Received results of [facility] comprehensive lipidemia panel    Variant of uncertain significance was identified in the region of CETP.  unclear significance.  Will discuss with patient further at follow-up visit    Michael J Bloch, MD  Certified Clinical Lipid Specialist  Medial Director, [facility]

## 2023-06-28 ENCOUNTER — HOSPITAL ENCOUNTER (OUTPATIENT)
Dept: LAB | Facility: MEDICAL CENTER | Age: 58
End: 2023-06-28
Attending: FAMILY MEDICINE
Payer: COMMERCIAL

## 2023-06-28 DIAGNOSIS — Z82.49 FAMILY HISTORY OF PREMATURE CAD: ICD-10-CM

## 2023-06-28 DIAGNOSIS — E78.2 MIXED HYPERLIPIDEMIA: ICD-10-CM

## 2023-06-28 LAB
ALBUMIN SERPL BCP-MCNC: 4.8 G/DL (ref 3.2–4.9)
ALBUMIN/GLOB SERPL: 2 G/DL
ALP SERPL-CCNC: 47 U/L (ref 30–99)
ALT SERPL-CCNC: 29 U/L (ref 2–50)
ANION GAP SERPL CALC-SCNC: 10 MMOL/L (ref 7–16)
AST SERPL-CCNC: 19 U/L (ref 12–45)
BILIRUB SERPL-MCNC: 1.1 MG/DL (ref 0.1–1.5)
BUN SERPL-MCNC: 14 MG/DL (ref 8–22)
CALCIUM ALBUM COR SERPL-MCNC: 8.8 MG/DL (ref 8.5–10.5)
CALCIUM SERPL-MCNC: 9.4 MG/DL (ref 8.5–10.5)
CHLORIDE SERPL-SCNC: 107 MMOL/L (ref 96–112)
CHOLEST SERPL-MCNC: 252 MG/DL (ref 100–199)
CO2 SERPL-SCNC: 27 MMOL/L (ref 20–33)
CREAT SERPL-MCNC: 0.91 MG/DL (ref 0.5–1.4)
CRP SERPL HS-MCNC: 0.5 MG/L (ref 0–3)
FASTING STATUS PATIENT QL REPORTED: NORMAL
GFR SERPLBLD CREATININE-BSD FMLA CKD-EPI: 99 ML/MIN/1.73 M 2
GLOBULIN SER CALC-MCNC: 2.4 G/DL (ref 1.9–3.5)
GLUCOSE SERPL-MCNC: 102 MG/DL (ref 65–99)
HDLC SERPL-MCNC: 45 MG/DL
LDLC SERPL CALC-MCNC: 129 MG/DL
POTASSIUM SERPL-SCNC: 3.9 MMOL/L (ref 3.6–5.5)
PROT SERPL-MCNC: 7.2 G/DL (ref 6–8.2)
SODIUM SERPL-SCNC: 144 MMOL/L (ref 135–145)
TRIGL SERPL-MCNC: 388 MG/DL (ref 0–149)

## 2023-06-28 PROCEDURE — 82172 ASSAY OF APOLIPOPROTEIN: CPT

## 2023-06-28 PROCEDURE — 86141 C-REACTIVE PROTEIN HS: CPT

## 2023-06-28 PROCEDURE — 80053 COMPREHEN METABOLIC PANEL: CPT

## 2023-06-28 PROCEDURE — 36415 COLL VENOUS BLD VENIPUNCTURE: CPT

## 2023-06-28 PROCEDURE — 80061 LIPID PANEL: CPT

## 2023-06-28 PROCEDURE — 83695 ASSAY OF LIPOPROTEIN(A): CPT

## 2023-06-30 LAB
APO B100 SERPL-MCNC: 131 MG/DL (ref 55–140)
LDLC SERPL-MCNC: 145 MG/DL (ref 0–129)
LPA SERPL-MCNC: 37 MG/DL

## 2023-07-06 ENCOUNTER — OFFICE VISIT (OUTPATIENT)
Dept: VASCULAR LAB | Facility: MEDICAL CENTER | Age: 58
End: 2023-07-06
Attending: FAMILY MEDICINE
Payer: COMMERCIAL

## 2023-07-06 VITALS
DIASTOLIC BLOOD PRESSURE: 79 MMHG | HEIGHT: 74 IN | HEART RATE: 71 BPM | WEIGHT: 235.2 LBS | SYSTOLIC BLOOD PRESSURE: 122 MMHG | BODY MASS INDEX: 30.18 KG/M2

## 2023-07-06 DIAGNOSIS — I25.10 CORONARY ARTERY CALCIFICATION SEEN ON CT SCAN: ICD-10-CM

## 2023-07-06 DIAGNOSIS — R03.0 ELEVATED BLOOD PRESSURE READING: ICD-10-CM

## 2023-07-06 DIAGNOSIS — Z82.49 FAMILY HISTORY OF PREMATURE CAD: ICD-10-CM

## 2023-07-06 DIAGNOSIS — E66.09 CLASS 1 OBESITY DUE TO EXCESS CALORIES WITH SERIOUS COMORBIDITY AND BODY MASS INDEX (BMI) OF 30.0 TO 30.9 IN ADULT: ICD-10-CM

## 2023-07-06 DIAGNOSIS — R93.1 AGATSTON CORONARY ARTERY CALCIUM SCORE LESS THAN 100: ICD-10-CM

## 2023-07-06 DIAGNOSIS — E78.2 MIXED HYPERLIPIDEMIA: ICD-10-CM

## 2023-07-06 PROCEDURE — 99212 OFFICE O/P EST SF 10 MIN: CPT

## 2023-07-06 PROCEDURE — 3074F SYST BP LT 130 MM HG: CPT | Performed by: FAMILY MEDICINE

## 2023-07-06 PROCEDURE — 3078F DIAST BP <80 MM HG: CPT | Performed by: FAMILY MEDICINE

## 2023-07-06 PROCEDURE — 99214 OFFICE O/P EST MOD 30 MIN: CPT | Performed by: FAMILY MEDICINE

## 2023-07-06 RX ORDER — ROSUVASTATIN CALCIUM 20 MG/1
20 TABLET, COATED ORAL EVERY EVENING
Qty: 90 TABLET | Refills: 3 | Status: SHIPPED | OUTPATIENT
Start: 2023-07-06

## 2023-07-06 ASSESSMENT — ENCOUNTER SYMPTOMS
COUGH: 0
CLAUDICATION: 0
FEVER: 0
SPUTUM PRODUCTION: 0
PND: 0
ORTHOPNEA: 0
HEMOPTYSIS: 0
PALPITATIONS: 0
SHORTNESS OF BREATH: 0
WHEEZING: 0
CHILLS: 0

## 2023-07-06 ASSESSMENT — FIBROSIS 4 INDEX: FIB4 SCORE: 0.97

## 2023-07-06 NOTE — PROGRESS NOTES
[facility] - FOLLOW-UP  07/06/23     Francesco Rodriguez has been referred for evaluation and management of dyslipidemia    Referral Source: Moiz Rincon*     Subjective   Interval hx/concerns: last seen 5/2023, feeling well, had labs and Invite DLD panel, good adherence and tolerance of meds.      Initial visit hx:  severe HTG = 2,220 in 3/2023 prompting further eval and tx.  Ongoing mixed HLD despite current care.   No hx of acute pancreatitis  Change in weight:  increase over time   Wt Readings from Last 3 Encounters:   07/06/23 107 kg (235 lb 3.2 oz)   05/08/23 107 kg (235 lb)   03/29/23 109 kg (239 lb 9.6 oz)      Exercise habits: minimal exercise  Dietary patterns: reduce sat fat, simple CHO  Etoh: No, reduced to practically none   Reported barriers to care: none   History of ASCVD: None  Secondary causes of dyslipidemia: none   Other Established (non-atherosclerotic) Vascular Disease, if Present: None  Age at Initial Diagnosis of Dyslipidemia: 48yo  Current Prescription Lipid Lowering Medications - including dose:   Statin: rosuvastatin 10mg   Non-Statin: None  Current Lipid Lowering and Related Supplements: cholest-off, omega 3 FAs  Any Current Side Effects Potentially Related to Lipid Lowering therapy? No  Current Adherence to Lipid Lowering Therapies: Complete  Previously Attempted Interventions for Lipids - including outcome  Statin: crestor   Outcome:  stopped 10 years ago - was expensive   Non-Statin: None  Outcome: N/A  Any Previous History of Statin Intolerance? No  Baseline Lipids Prior to Treatment:    Latest Reference Range & Units 03/21/23 08:04   Cholesterol,Tot 100 - 199 mg/dL 451 (H)   Triglycerides 0 - 149 mg/dL 2220 (H)   HDL >=40 mg/dL see below   LDL <100 mg/dL see below     Anticoagulation/antiplats: No, no hx of bleeding    HTN:No prior dx or meds        Current Outpatient Medications:     IBUPROFEN PO, Take  by mouth., PRN    rosuvastatin, 20 mg, Oral, Q  "EVENING    CholestOff, , Taking    vitamin e, 400 Units, Oral, DAILY, Taking    fish oil, 1,000 mg, Oral, TID WITH MEALS, Taking    cetirizine, 10 mg, Oral, DAILY, Taking    ALLERGIES: Patient has no known allergies.    Family History   Problem Relation Age of Onset    Heart Disease Mother 60        s/p CABG    Hyperlipidemia Mother     Heart Failure Mother         cause of death    Heart Disease Father 65        s/p CABG, mid-60s    Hyperlipidemia Father     Peripheral vascular disease Father         smoker, stenting    Heart Failure Father 79        cause of death?    COPD Father     Hypertension Brother     Hyperlipidemia Brother     No Known Problems Brother     No Known Problems Son     Diabetes Neg Hx     Stroke Neg Hx        Social History     Tobacco Use    Smoking status: Never    Smokeless tobacco: Never   Vaping Use    Vaping Use: Never used   Substance Use Topics    Alcohol use: Yes     Alcohol/week: 8.4 oz     Types: 9 Cans of beer, 5 Shots of liquor per week     Comment: socially    Drug use: Yes     Types: Marijuana     Comment: \"less than once a week\"       Review of Systems   Constitutional:  Negative for chills, fever and malaise/fatigue.   Respiratory:  Negative for cough, hemoptysis, sputum production, shortness of breath and wheezing.    Cardiovascular:  Negative for chest pain, palpitations, orthopnea, claudication, leg swelling and PND.         Objective    Vitals:    07/06/23 1105   BP: 122/79   BP Location: Left arm   Patient Position: Sitting   BP Cuff Size: Large adult   Pulse: 71   Weight: 107 kg (235 lb 3.2 oz)   Height: 1.88 m (6' 2\")     BMI Readings from Last 1 Encounters:   07/06/23 30.20 kg/m²     BP Readings from Last 5 Encounters:   07/06/23 122/79   05/08/23 130/74   03/29/23 130/80   03/15/23 136/82   08/26/21 110/70     Physical Exam  Constitutional:       Appearance: Normal appearance. He is well-developed.   HENT:      Head: Normocephalic and atraumatic.   Eyes:      " Conjunctiva/sclera: Conjunctivae normal.      Pupils: Pupils are equal, round, and reactive to light.      Comments: No corneal arcus   Neck:      Thyroid: No thyromegaly.      Vascular: No JVD.   Cardiovascular:      Rate and Rhythm: Normal rate and regular rhythm.      Pulses:           Radial pulses are 2+ on the right side and 2+ on the left side.        Dorsalis pedis pulses are 2+ on the right side and 2+ on the left side.        Posterior tibial pulses are 2+ on the right side and 2+ on the left side.      Heart sounds: Normal heart sounds. No murmur heard.     No friction rub. No gallop.   Pulmonary:      Effort: Pulmonary effort is normal. No respiratory distress.      Breath sounds: Normal breath sounds.   Musculoskeletal:      Cervical back: Normal range of motion and neck supple.      Comments: No achilles tendon thickening  No extensor surface xanthomas at dorsal hands, patellae, achilles.    Has multiple flexor surface tendon nodularity that appears to be dupuytren's contractures   Lymphadenopathy:      Cervical: No cervical adenopathy.   Skin:     General: Skin is warm and dry.      Comments: No periocular xanthelasma   Neurological:      General: No focal deficit present.      Mental Status: He is alert and oriented to person, place, and time.      Cranial Nerves: No cranial nerve deficit.         DATA REVIEW:  Most Recent Lipid Panel:   Lab Results   Component Value Date    CHOLSTRLTOT 252 (H) 06/28/2023    TRIGLYCERIDE 388 (H) 06/28/2023    HDL 45 06/28/2023     (H) 06/28/2023      Latest Reference Range & Units 06/28/23 07:29   LDL Direct 0 - 129 mg/dL 145 (H)      Latest Reference Range & Units 03/21/23 08:04 05/03/23 08:11 06/28/23 07:29   Triglycerides 0 - 149 mg/dL 2220 (H) 272 (H) 388 (H)     Lab Results   Component Value Date/Time    LIPOPROTA 37 (H) 06/28/2023 07:29 AM      Lab Results   Component Value Date/Time    APOB 131 06/28/2023 07:29 AM         Other Pertinent Blood Work:    Lab Results   Component Value Date    SODIUM 144 06/28/2023    POTASSIUM 3.9 06/28/2023    CHLORIDE 107 06/28/2023    CO2 27 06/28/2023    ANION 10.0 06/28/2023    GLUCOSE 102 (H) 06/28/2023    BUN 14 06/28/2023    CREATININE 0.91 06/28/2023    CALCIUM 9.4 06/28/2023    ASTSGOT 19 06/28/2023    ALTSGPT 29 06/28/2023    ALKPHOSPHAT 47 06/28/2023    TBILIRUBIN 1.1 06/28/2023    ALBUMIN 4.8 06/28/2023    AGRATIO 2.0 06/28/2023    CREACTPROT <0.30 03/21/2023    LIPOPROTA 37 (H) 06/28/2023    TSHULTRASEN 2.040 05/03/2023      Latest Reference Range & Units Most Recent   TSH 0.380 - 5.330 uIU/mL 2.040  5/3/23 08:11   Rheumatoid Factor -Neph- 0 - 14 IU/mL 12  3/21/23 08:04   Stat C-Reactive Protein 0.00 - 0.75 mg/dL <0.30  3/21/23 08:04   Ccp Antibodies 0 - 19 Units 2  3/21/23 08:04   Antinuclear Antibody None Detected  None Detected  3/21/23 08:04       VASCULAR IMAGING:     CACS 5/21/23  Coronary calcification:  LMA - 0.0  LCX - 0.0  LAD - 20.0  RCA - 0.0  PDA - 0.0   Total Calcium Score: 20.0   Percentile: Calcium score is above the 25th percentile for the patient's age and sex.   Other findings:  Heart: Normal size. There appears to be calcification of the aortic valve.  Lungs: Clear.  Mediastinum: Normal.  Upper abdomen: Normal.  Soft tissues: There is apparent left gynecomastia.          ASSESSMENT AND PLAN  1. Mixed hyperlipidemia  rosuvastatin (CRESTOR) 20 MG Tab    Comp Metabolic Panel    Lipid Profile    LDL, DIRECT    APOLIPOPROTEIN B      2. Elevated blood pressure reading        3. Family history of premature CAD        4. Class 1 obesity due to excess calories with serious comorbidity and body mass index (BMI) of 30.0 to 30.9 in adult        5. Coronary artery calcification seen on CT scan        6. Agatston coronary artery calcium score less than 100          Patient Type, check all that apply: Primary Prevention    Established Atherosclerotic Cardiovascular Disease (ASCVD): yes    subclinical CAD, as  "evidenced by CACS = 20 (60%ile for age/gender) in 2023  No symptoms, no prior dx ASCVD events  - continue med mgmt, consider repeat 5-7yrs      Other Established (non-atherosclerotic) Vascular Disease, if Present: None    Evidence of Heterozygous Familial Hypercholesterolemia (FH): Possible , prior TC = 451 (unable to calc LDL due to severe HTG)  Strong fhx of premature CAD (see fhx)     Presumed dx:  FCHL (aka \"multifactorial combined HLD\") based upon high LDL-C/high TG with type IIb phenotype   - most commonly inherited DLD (~1 in 200), generally polygenic often with significant famhx of DLD and premature ASCVD.  significant increased risk for premature CVD (>20% develop CHD <61yo) and requires aggressive lipid lowering interventions including diet and meds   - will track apoB moving forward     FH genotyping: [facility]     ACC/AHA Indication for Statin Therapy, senthil all that apply:  Primary Severe HLD / FH (baseline LDL-C >190)    Calculated Risk for ASCVD, if applicable    The 10-year ASCVD risk score (Brenda DOAN, et al., 2019) is: 7.4%,  7.5 - <20% \"intermediate risk\"     WELDON risk score w/ CAC = 8.8 % (slight \"de-risking)  WELDON risk score w/o CAC = 10.5 %  WELDON CAC percentile for age/gender = 60 %     Other Significant Risk Markers, if any, senthil all that apply   Other: pending further w/u   Lp(a) slight high, not risk-enhancing     Risk-enhancers: Metabolic syndrome  and Persistently elevated trigs >174    Goal LDL-C and nonHDL-C based on Clinic Protocol  Due to HTG >200, as of 7/2023   Primary: TG <500 - yes   Secondary: apoB <90 - no     Direct LDL <100 - no    LIFESTYLE INTERVENTIONS:    SMOKING:    reports that he has never smoked. He has never used smokeless tobacco.   - continued complete avoidance of all tobacco products     PHYSICAL ACTIVITY: continue healthy activity to improve CV fitness.  In general, targeting >150min/week of moderate-level activity or as much as tolerated in light of functional status " and co-morbidities     WEIGHT MANAGEMENT AND NUTRITION: Mediterranean style dietary approach , Triglyceride-lowering diet with reduced fat calories, reduced simple carbohydrates, reduce/avoid alcohol, and Provide specific dietary approach handouts      LIPID LOWERING MEDICATION MANAGEMENT:     Statin Therapy Recommendations from Today’s Visit:   - increase rosuva to 20mg daily     Non-Statin Medications Recommendations from Today’s Visit: none    Indication for PCSK9 Inhibitor strategy, if applicable: Not currently indicated    Supplements Recommended at this visit: ok to continue cholest-off     RECOMMENDATIONS FOR OTHER CV RISK FACTORS:    1) BLOOD PRESSURE MANAGEMENT:  ACC/AHA (2017) goal <130/80  Home BP at goal: yes  Office BP at goal:  yes   Plan:   - continue healthy diet, activity, weight mgmt   Monitoring:   - routine clinic-based BP measurements at least once annually   Medications: no meds indicated at this time      2) Glycemic Status: Normal    ANTITHROMBOTIC THERAPY None    OTHER ISSUES: none     Studies Ordered at Todays Visit: none   Blood Work Ordered At Today’s visit: as noted above   Follow-Up: 3mo    Kenney Zamora M.D.  [facility], Board-certified clinical lipidologist   [facility]   [facility]   [phone]     CC:  Moiz Rincon M.D.  Moiz Rincon*

## 2023-11-03 ENCOUNTER — HOSPITAL ENCOUNTER (OUTPATIENT)
Dept: LAB | Facility: MEDICAL CENTER | Age: 58
End: 2023-11-03
Attending: FAMILY MEDICINE
Payer: COMMERCIAL

## 2023-11-03 DIAGNOSIS — E78.2 MIXED HYPERLIPIDEMIA: ICD-10-CM

## 2023-11-03 LAB
ALBUMIN SERPL BCP-MCNC: 4.8 G/DL (ref 3.2–4.9)
ALBUMIN/GLOB SERPL: 2.1 G/DL
ALP SERPL-CCNC: 43 U/L (ref 30–99)
ALT SERPL-CCNC: 32 U/L (ref 2–50)
ANION GAP SERPL CALC-SCNC: 9 MMOL/L (ref 7–16)
AST SERPL-CCNC: 21 U/L (ref 12–45)
BILIRUB SERPL-MCNC: 0.8 MG/DL (ref 0.1–1.5)
BUN SERPL-MCNC: 14 MG/DL (ref 8–22)
CALCIUM ALBUM COR SERPL-MCNC: 8.8 MG/DL (ref 8.5–10.5)
CALCIUM SERPL-MCNC: 9.4 MG/DL (ref 8.5–10.5)
CHLORIDE SERPL-SCNC: 102 MMOL/L (ref 96–112)
CHOLEST SERPL-MCNC: 288 MG/DL (ref 100–199)
CO2 SERPL-SCNC: 28 MMOL/L (ref 20–33)
CREAT SERPL-MCNC: 0.81 MG/DL (ref 0.5–1.4)
GFR SERPLBLD CREATININE-BSD FMLA CKD-EPI: 103 ML/MIN/1.73 M 2
GLOBULIN SER CALC-MCNC: 2.3 G/DL (ref 1.9–3.5)
GLUCOSE SERPL-MCNC: 94 MG/DL (ref 65–99)
HDLC SERPL-MCNC: 42 MG/DL
LDLC SERPL CALC-MCNC: ABNORMAL MG/DL
POTASSIUM SERPL-SCNC: 3.7 MMOL/L (ref 3.6–5.5)
PROT SERPL-MCNC: 7.1 G/DL (ref 6–8.2)
SODIUM SERPL-SCNC: 139 MMOL/L (ref 135–145)
TRIGL SERPL-MCNC: 687 MG/DL (ref 0–149)

## 2023-11-03 PROCEDURE — 80053 COMPREHEN METABOLIC PANEL: CPT

## 2023-11-03 PROCEDURE — 80061 LIPID PANEL: CPT

## 2023-11-03 PROCEDURE — 82172 ASSAY OF APOLIPOPROTEIN: CPT

## 2023-11-03 PROCEDURE — 36415 COLL VENOUS BLD VENIPUNCTURE: CPT

## 2023-11-06 LAB
APO B100 SERPL-MCNC: 135 MG/DL (ref 66–133)
LDLC SERPL-MCNC: 108 MG/DL (ref 0–129)

## 2023-11-07 ENCOUNTER — OFFICE VISIT (OUTPATIENT)
Dept: MEDICAL GROUP | Facility: LAB | Age: 58
End: 2023-11-07
Payer: COMMERCIAL

## 2023-11-07 VITALS
DIASTOLIC BLOOD PRESSURE: 72 MMHG | TEMPERATURE: 98.6 F | OXYGEN SATURATION: 96 % | HEART RATE: 78 BPM | HEIGHT: 74 IN | RESPIRATION RATE: 14 BRPM | BODY MASS INDEX: 30.67 KG/M2 | WEIGHT: 239 LBS | SYSTOLIC BLOOD PRESSURE: 128 MMHG

## 2023-11-07 DIAGNOSIS — F32.1 CURRENT MODERATE EPISODE OF MAJOR DEPRESSIVE DISORDER WITHOUT PRIOR EPISODE (HCC): ICD-10-CM

## 2023-11-07 DIAGNOSIS — F41.1 GAD (GENERALIZED ANXIETY DISORDER): ICD-10-CM

## 2023-11-07 PROCEDURE — 3074F SYST BP LT 130 MM HG: CPT | Performed by: FAMILY MEDICINE

## 2023-11-07 PROCEDURE — 99214 OFFICE O/P EST MOD 30 MIN: CPT | Performed by: FAMILY MEDICINE

## 2023-11-07 PROCEDURE — 3078F DIAST BP <80 MM HG: CPT | Performed by: FAMILY MEDICINE

## 2023-11-07 RX ORDER — ESCITALOPRAM OXALATE 10 MG/1
10 TABLET ORAL DAILY
Qty: 90 TABLET | Refills: 1 | Status: SHIPPED | OUTPATIENT
Start: 2023-11-07 | End: 2023-12-21

## 2023-11-07 ASSESSMENT — ANXIETY QUESTIONNAIRES
3. WORRYING TOO MUCH ABOUT DIFFERENT THINGS: MORE THAN HALF THE DAYS
GAD7 TOTAL SCORE: 7
4. TROUBLE RELAXING: SEVERAL DAYS
2. NOT BEING ABLE TO STOP OR CONTROL WORRYING: SEVERAL DAYS
6. BECOMING EASILY ANNOYED OR IRRITABLE: SEVERAL DAYS
1. FEELING NERVOUS, ANXIOUS, OR ON EDGE: SEVERAL DAYS
5. BEING SO RESTLESS THAT IT IS HARD TO SIT STILL: SEVERAL DAYS
7. FEELING AFRAID AS IF SOMETHING AWFUL MIGHT HAPPEN: NOT AT ALL

## 2023-11-07 ASSESSMENT — PATIENT HEALTH QUESTIONNAIRE - PHQ9
SUM OF ALL RESPONSES TO PHQ QUESTIONS 1-9: 11
CLINICAL INTERPRETATION OF PHQ2 SCORE: 3
5. POOR APPETITE OR OVEREATING: 3 - NEARLY EVERY DAY

## 2023-11-07 ASSESSMENT — FIBROSIS 4 INDEX: FIB4 SCORE: 1.034275590392263543

## 2023-11-07 NOTE — PROGRESS NOTES
Subjective:     CC: Anxiety and depression    HPI:   Francesco presents today with concern for anxiety and depression.  Reports worsening mood symptoms over the last 5-month, may have been triggered with some stress at work and having to return to the office full-time.  Notes prominent lack of motivation and anhedonia, worrying, overeating.  No previous treatment for anxiety or depression.  Would like to start medication if indicated.  No current therapy or other treatments.    CED-7 Questionnaire    Feeling nervous, anxious, or on edge: Several days  Not being able to sop or control worrying: Several days  Worrying too much about different things: More than half the days  Trouble relaxing: Several days  Being so restless that it's hard to sit still: Several days  Becoming easily annoyed or irritable: Several days  Feeling afraid as if something awful might happen: Not at all  Total: 7    Interpretation of CED 7 Total Score   Score Severity :  0-4 No Anxiety   5-9 Mild Anxiety  10-14 Moderate Anxiety  15-21 Severe Anxiety     Depression Screening    Little interest or pleasure in doing things?  2 - more than half the days   Feeling down, depressed , or hopeless? 1 - several days   Trouble falling or staying asleep, or sleeping too much?  2 - more than half the days   Feeling tired or having little energy?  2 - more than half the days   Poor appetite or overeating?  3 - nearly every day   Feeling bad about yourself - or that you are a failure or have let yourself or your family down? 0 - not at all   Trouble concentrating on things, such as reading the newspaper or watching television? 1 - several days   Moving or speaking so slowly that other people could have noticed.  Or the opposite - being so fidgety or restless that you have been moving around a lot more than usual?  0 - not at all   Thoughts that you would be better off dead, or of hurting yourself?  0 - not at all   Patient Health Questionnaire Score: 11  "      If depressive symptoms identified deferred to follow up visit unless specifically addressed in assesment and plan.    Interpretation of PHQ-9 Total Score   Score Severity   1-4 No Depression   5-9 Mild Depression   10-14 Moderate Depression   15-19 Moderately Severe Depression   20-27 Severe Depression     Medications, past medical history, allergies, and social history have been reviewed and updated.      Objective:       Exam:  /72 (BP Location: Left arm, Patient Position: Sitting, BP Cuff Size: Adult)   Pulse 78   Temp 37 °C (98.6 °F)   Resp 14   Ht 1.88 m (6' 2\")   Wt 108 kg (239 lb)   SpO2 96%   BMI 30.69 kg/m²  Body mass index is 30.69 kg/m².    Constitutional: Alert. Well appearing. No distress.  Skin: Warm, dry, good turgor, no visible rashes.  Respiratory: Normal effort.   Neuro: Moves all four extremities. No facial droop.  Psych: Answers questions appropriately. Normal affect and mood.    Assessment & Plan:     58 y.o. male with the following -     1. Current moderate episode of major depressive disorder without prior episode (HCC)  2. CED (generalized anxiety disorder)  New diagnoses today.  Moderate depression and anxiety worsening over the past 5+ months.  He would like to start medication and starting Lexapro as below.  Referral placed to establish with therapy.  We also discussed lifestyle measures including regular exercise.  Follow-up in 1 month.  - escitalopram (LEXAPRO) 10 MG Tab; Take 1 Tablet by mouth every day.  Dispense: 90 Tablet; Refill: 1  - Referral to Behavioral Health      Please note that this note was created using voice recognition software.      "

## 2023-11-10 ENCOUNTER — OFFICE VISIT (OUTPATIENT)
Dept: VASCULAR LAB | Facility: MEDICAL CENTER | Age: 58
End: 2023-11-10
Attending: FAMILY MEDICINE
Payer: COMMERCIAL

## 2023-11-10 VITALS
WEIGHT: 237 LBS | SYSTOLIC BLOOD PRESSURE: 125 MMHG | DIASTOLIC BLOOD PRESSURE: 81 MMHG | BODY MASS INDEX: 30.42 KG/M2 | HEART RATE: 72 BPM | HEIGHT: 74 IN

## 2023-11-10 DIAGNOSIS — E78.2 MIXED HYPERLIPIDEMIA: ICD-10-CM

## 2023-11-10 PROCEDURE — 99212 OFFICE O/P EST SF 10 MIN: CPT

## 2023-11-10 PROCEDURE — 3079F DIAST BP 80-89 MM HG: CPT | Performed by: FAMILY MEDICINE

## 2023-11-10 PROCEDURE — 99214 OFFICE O/P EST MOD 30 MIN: CPT | Performed by: FAMILY MEDICINE

## 2023-11-10 PROCEDURE — 3074F SYST BP LT 130 MM HG: CPT | Performed by: FAMILY MEDICINE

## 2023-11-10 RX ORDER — FENOFIBRIC ACID 135 MG/1
1 CAPSULE, DELAYED RELEASE ORAL DAILY
Qty: 90 CAPSULE | Refills: 3 | Status: SHIPPED | OUTPATIENT
Start: 2023-11-10

## 2023-11-10 ASSESSMENT — ENCOUNTER SYMPTOMS
CHILLS: 0
WHEEZING: 0
ORTHOPNEA: 0
SPUTUM PRODUCTION: 0
PALPITATIONS: 0
HEMOPTYSIS: 0
COUGH: 0
PND: 0
SHORTNESS OF BREATH: 0
FEVER: 0
CLAUDICATION: 0

## 2023-11-10 ASSESSMENT — FIBROSIS 4 INDEX: FIB4 SCORE: 1.034275590392263543

## 2023-11-10 NOTE — PROGRESS NOTES
[facility] - FOLLOW-UP  11/10/23     Francesco Rodriguez has been referred for evaluation and management of dyslipidemia    Referral Source: Moiz Rincon*     Subjective   Interval hx/concerns: last seen 7/2023, feeling well, had labs and [facility] DLD panel, good adherence and tolerance of meds.  Reports having progressive mood d/o, reduced healthy diet.  Denies etoh.  Started on lexapro per PCP      LIFESTYLE MGMT:  Change in weight:  increase over time   Wt Readings from Last 3 Encounters:   11/10/23 108 kg (237 lb)   11/07/23 108 kg (239 lb)   07/06/23 107 kg (235 lb 3.2 oz)      Exercise habits: minimal exercise  Dietary patterns: fallen off prior healthy diet   Etoh: No, had reduced, now stopped 8/2023  Reported barriers to care: MDD    MED MGMT:  Current Prescription Lipid Lowering Medications - including dose:   Statin: rosuvastatin 20mg   Non-Statin: None  Current Lipid Lowering and Related Supplements: cholest-off, omega 3 FAs  Any Current Side Effects Potentially Related to Lipid Lowering therapy? No  Current Adherence to Lipid Lowering Therapies: Complete    PERTINENT HLD PMHX:  Initial visit hx:  severe HTG = 2,220 in 3/2023 prompting further eval and tx.  Ongoing mixed HLD despite current care.   No hx of acute pancreatitis  History of ASCVD: None  Secondary causes of dyslipidemia:  prior etoh, obesity  Other Established (non-atherosclerotic) Vascular Disease, if Present: None  Age at Initial Diagnosis of Dyslipidemia: 48yo  Previously Attempted Interventions for Lipids - including outcome  Statin: crestor    Outcome:  stopped 10 years ago - was expensive   Non-Statin: None  Outcome: N/A  Any Previous History of Statin Intolerance? No  Baseline Lipids Prior to Treatment:    Latest Reference Range & Units 03/21/23 08:04   Cholesterol,Tot 100 - 199 mg/dL 451 (H)   Triglycerides 0 - 149 mg/dL 2220 (H)   HDL >=40 mg/dL see below   LDL <100 mg/dL see below  "    Anticoagulation/antiplats: No, no hx of bleeding    HTN:No prior dx or meds        Current Outpatient Medications:     Fenofibric Acid, 1 Tablet, Oral, DAILY    escitalopram, 10 mg, Oral, DAILY, Taking    IBUPROFEN PO, Take  by mouth., Taking    rosuvastatin, 20 mg, Oral, Q EVENING, Taking    CholestOff, , Taking    vitamin e, 400 Units, Oral, DAILY, Taking    cetirizine, 10 mg, Oral, DAILY, Taking    ALLERGIES: Patient has no known allergies.    Family History   Problem Relation Age of Onset    Heart Disease Mother 60        s/p CABG    Hyperlipidemia Mother     Heart Failure Mother         cause of death    Heart Disease Father 65        s/p CABG, mid-60s    Hyperlipidemia Father     Peripheral vascular disease Father         smoker, stenting    Heart Failure Father 79        cause of death?    COPD Father     Hypertension Brother     Hyperlipidemia Brother     No Known Problems Brother     No Known Problems Son     Diabetes Neg Hx     Stroke Neg Hx        Social History     Tobacco Use    Smoking status: Never    Smokeless tobacco: Never   Vaping Use    Vaping Use: Never used   Substance Use Topics    Alcohol use: Yes     Alcohol/week: 8.4 oz     Types: 9 Cans of beer, 5 Shots of liquor per week     Comment: socially    Drug use: Not Currently     Types: Marijuana     Comment: \"less than once a week\"       Review of Systems   Constitutional:  Negative for chills, fever and malaise/fatigue.   Respiratory:  Negative for cough, hemoptysis, sputum production, shortness of breath and wheezing.    Cardiovascular:  Negative for chest pain, palpitations, orthopnea, claudication, leg swelling and PND.         Objective    Vitals:    11/10/23 0945   BP: 125/81   BP Location: Left arm   Patient Position: Sitting   BP Cuff Size: Adult   Pulse: 72   Weight: 108 kg (237 lb)   Height: 1.88 m (6' 2\")     BMI Readings from Last 1 Encounters:   11/10/23 30.43 kg/m²     BP Readings from Last 5 Encounters:   11/10/23 125/81 "   11/07/23 128/72   07/06/23 122/79   05/08/23 130/74   03/29/23 130/80     Physical Exam  Constitutional:       Appearance: Normal appearance. He is well-developed.   HENT:      Head: Normocephalic and atraumatic.   Eyes:      Conjunctiva/sclera: Conjunctivae normal.      Pupils: Pupils are equal, round, and reactive to light.      Comments: No corneal arcus   Neck:      Thyroid: No thyromegaly.      Vascular: No JVD.   Cardiovascular:      Rate and Rhythm: Normal rate and regular rhythm.      Pulses:           Radial pulses are 2+ on the right side and 2+ on the left side.        Dorsalis pedis pulses are 2+ on the right side and 2+ on the left side.        Posterior tibial pulses are 2+ on the right side and 2+ on the left side.      Heart sounds: Normal heart sounds. No murmur heard.     No friction rub. No gallop.   Pulmonary:      Effort: Pulmonary effort is normal. No respiratory distress.      Breath sounds: Normal breath sounds.   Musculoskeletal:      Cervical back: Normal range of motion and neck supple.      Comments: No achilles tendon thickening  No extensor surface xanthomas at dorsal hands, patellae, achilles.    Has multiple flexor surface tendon nodularity that appears to be dupuytren's contractures   Lymphadenopathy:      Cervical: No cervical adenopathy.   Skin:     General: Skin is warm and dry.      Comments: No periocular xanthelasma   Neurological:      General: No focal deficit present.      Mental Status: He is alert and oriented to person, place, and time.      Cranial Nerves: No cranial nerve deficit.       DATA REVIEW:  Most Recent Lipid Panel:   Lab Results   Component Value Date    CHOLSTRLTOT 288 (H) 11/03/2023    TRIGLYCERIDE 687 (H) 11/03/2023    HDL 42 11/03/2023    LDL see below 11/03/2023     Lab Results   Component Value Date/Time    LDL see below 11/03/2023 08:02 AM     (H) 06/28/2023 07:29 AM     (H) 05/03/2023 08:11 AM    LDL see below 03/21/2023 08:04 AM         Latest Reference Range & Units 06/28/23 07:29 11/03/23 08:02   LDL Direct 0 - 129 mg/dL 145 (H) 108      Latest Reference Range & Units 03/21/23 08:04 05/03/23 08:11 06/28/23 07:29 11/03/23 08:02   Triglycerides 0 - 149 mg/dL 2220 (H) 272 (H) 388 (H) 687 (H)     Lab Results   Component Value Date/Time    LIPOPROTA 37 (H) 06/28/2023 07:29 AM      Lab Results   Component Value Date/Time    APOB 135 (H) 11/03/2023 08:02 AM       Other Pertinent Blood Work:   Lab Results   Component Value Date    SODIUM 139 11/03/2023    POTASSIUM 3.7 11/03/2023    CHLORIDE 102 11/03/2023    CO2 28 11/03/2023    ANION 9.0 11/03/2023    GLUCOSE 94 11/03/2023    BUN 14 11/03/2023    CREATININE 0.81 11/03/2023    CALCIUM 9.4 11/03/2023    ASTSGOT 21 11/03/2023    ALTSGPT 32 11/03/2023    ALKPHOSPHAT 43 11/03/2023    TBILIRUBIN 0.8 11/03/2023    ALBUMIN 4.8 11/03/2023    AGRATIO 2.1 11/03/2023    CRPHIGHSEN 0.5 06/28/2023    CREACTPROT <0.30 03/21/2023    LIPOPROTA 37 (H) 06/28/2023    TSHULTRASEN 2.040 05/03/2023      Latest Reference Range & Units Most Recent   TSH 0.380 - 5.330 uIU/mL 2.040  5/3/23 08:11   Rheumatoid Factor -Neph- 0 - 14 IU/mL 12  3/21/23 08:04   Stat C-Reactive Protein 0.00 - 0.75 mg/dL <0.30  3/21/23 08:04   Ccp Antibodies 0 - 19 Units 2  3/21/23 08:04   Antinuclear Antibody None Detected  None Detected  3/21/23 08:04     VASCULAR IMAGING:     CACS 5/21/23  Coronary calcification:  LMA - 0.0  LCX - 0.0  LAD - 20.0  RCA - 0.0  PDA - 0.0   Total Calcium Score: 20.0   Percentile: Calcium score is above the 25th percentile for the patient's age and sex.   Other findings:  Heart: Normal size. There appears to be calcification of the aortic valve.  Lungs: Clear.  Mediastinum: Normal.  Upper abdomen: Normal.  Soft tissues: There is apparent left gynecomastia.          ASSESSMENT AND PLAN  1. Mixed hyperlipidemia  LipoFit by NMR    APOLIPOPROTEIN B    LDL, DIRECT    APO E GENOTYPING,CARDIO RISK    Choline Fenofibrate  "(FENOFIBRIC ACID) 135 MG CAPSULE DELAYED RELEASE        Patient Type, check all that apply: Primary Prevention    Established Atherosclerotic Cardiovascular Disease (ASCVD): yes    1) subclinical CAD, as evidenced by CACS = 20 (60%ile for age/gender) in 2023  No symptoms, no prior dx ASCVD events  - continue med mgmt, consider repeat 5-7yrs      Other Established (non-atherosclerotic) Vascular Disease, if Present: None    Evidence of Heterozygous Familial Hypercholesterolemia (FH): Possible , prior TC = 451 (unable to calc LDL due to severe HTG)  Strong fhx of premature CAD (see fhx)     Presumed dx:  FCHL (aka \"multifactorial combined HLD\") based upon high LDL-C/high TG with type IIb phenotype   - most commonly inherited DLD (~1 in 200), generally polygenic often with significant famhx of DLD and premature ASCVD.  significant increased risk for premature CVD (>20% develop CHD <61yo) and requires aggressive lipid lowering interventions including diet and meds   - will track apoB moving forward     FH genotyping: [facility] 5/2023  CETP VUS - pt does not have baseline hyperalphalipoproteinemia and HDL is average.    Theoretically, per CETP function loss (transfer to TG between VLDL to HDL and LDL), could increase VLDL particles (thus TGs) from lower transfer potential leading to increase TGs   - I do not have a VLDL number to look at however apoB remains very high despite rosuvastatin use   - will analyze VLDL with next labs and NMR to review particles   - add apoE genotyping to r/o FDBL         ACC/AHA Indication for Statin Therapy, senthil all that apply:  Primary Severe HLD / FH (baseline LDL-C >190)    Calculated Risk for ASCVD, if applicable    The 10-year ASCVD risk score (Brenda DK, et al., 2019) is: 10.1%,  7.5 - <20% \"intermediate risk\"     WELDON risk score w/ CAC = 8.8 % (slight \"de-risking)  WELDON risk score w/o CAC = 10.5 %  WELDON CAC percentile for age/gender = 60 %     Other Significant Risk Markers, if any, " senthil all that apply   Other: pending further w/u   Lp(a) slight high, not risk-enhancing     Risk-enhancers: Metabolic syndrome  and Persistently elevated trigs >174    Goal LDL-C and nonHDL-C based on Clinic Protocol  Due to HTG >200, as of 7/2023   Primary: TG <500 - yes   Secondary: apoB <90 - no     Direct LDL <100 - no    LIFESTYLE INTERVENTIONS:    SMOKING:    reports that he has never smoked. He has never used smokeless tobacco.   - continued complete avoidance of all tobacco products     PHYSICAL ACTIVITY: continue healthy activity to improve CV fitness.  In general, targeting >150min/week of moderate-level activity or as much as tolerated in light of functional status and co-morbidities     WEIGHT MANAGEMENT AND NUTRITION: Mediterranean style dietary approach , Triglyceride-lowering diet with reduced fat calories, reduced simple carbohydrates, reduce/avoid alcohol, and Provide specific dietary approach handouts      LIPID LOWERING MEDICATION MANAGEMENT:     Statin Therapy Recommendations from Today’s Visit:   - increase rosuva to 20mg daily     Non-Statin Medications Recommendations from Today’s Visit: none    Indication for PCSK9 Inhibitor strategy, if applicable: Not currently indicated    Supplements Recommended at this visit: ok to continue cholest-off     RECOMMENDATIONS FOR OTHER CV RISK FACTORS:    1) BLOOD PRESSURE MANAGEMENT:  ACC/AHA (2017) goal <130/80  Home BP at goal: yes  Office BP at goal:  yes   Plan:   - continue healthy diet, activity, weight mgmt   Monitoring:   - routine clinic-based BP measurements at least once annually   Medications: no meds indicated at this time      2) Glycemic Status: Normal    ANTITHROMBOTIC THERAPY None    OTHER ISSUES: none     Studies Ordered at Todays Visit: none   Blood Work Ordered At Today’s visit: as noted above   Follow-Up: 6 weeks     Kenney Zamora M.D.  FERNY, Board-certified clinical lipidologist   [facility]   [facility]   [phone]     CC:  THAD Michel Matthew J*

## 2023-11-16 ENCOUNTER — APPOINTMENT (RX ONLY)
Dept: URBAN - METROPOLITAN AREA CLINIC 35 | Facility: CLINIC | Age: 58
Setting detail: DERMATOLOGY
End: 2023-11-16

## 2023-11-16 DIAGNOSIS — L81.4 OTHER MELANIN HYPERPIGMENTATION: ICD-10-CM

## 2023-11-16 DIAGNOSIS — L82.1 OTHER SEBORRHEIC KERATOSIS: ICD-10-CM

## 2023-11-16 DIAGNOSIS — D22 MELANOCYTIC NEVI: ICD-10-CM

## 2023-11-16 DIAGNOSIS — Z71.89 OTHER SPECIFIED COUNSELING: ICD-10-CM

## 2023-11-16 PROBLEM — D22.5 MELANOCYTIC NEVI OF TRUNK: Status: ACTIVE | Noted: 2023-11-16

## 2023-11-16 PROCEDURE — 99213 OFFICE O/P EST LOW 20 MIN: CPT

## 2023-11-16 PROCEDURE — ? COUNSELING

## 2023-11-16 ASSESSMENT — LOCATION ZONE DERM
LOCATION ZONE: TRUNK
LOCATION ZONE: ARM
LOCATION ZONE: FACE

## 2023-11-16 ASSESSMENT — LOCATION DETAILED DESCRIPTION DERM
LOCATION DETAILED: LEFT POSTERIOR SHOULDER
LOCATION DETAILED: INFERIOR THORACIC SPINE
LOCATION DETAILED: EPIGASTRIC SKIN
LOCATION DETAILED: RIGHT INFERIOR MEDIAL FOREHEAD
LOCATION DETAILED: SUPERIOR THORACIC SPINE
LOCATION DETAILED: RIGHT POSTERIOR SHOULDER

## 2023-11-16 ASSESSMENT — LOCATION SIMPLE DESCRIPTION DERM
LOCATION SIMPLE: LEFT SHOULDER
LOCATION SIMPLE: RIGHT SHOULDER
LOCATION SIMPLE: ABDOMEN
LOCATION SIMPLE: UPPER BACK
LOCATION SIMPLE: RIGHT FOREHEAD

## 2023-12-04 ENCOUNTER — APPOINTMENT (RX ONLY)
Dept: URBAN - METROPOLITAN AREA CLINIC 20 | Facility: CLINIC | Age: 58
Setting detail: DERMATOLOGY
End: 2023-12-04

## 2023-12-04 DIAGNOSIS — L82.1 OTHER SEBORRHEIC KERATOSIS: ICD-10-CM

## 2023-12-04 PROCEDURE — ? LIQUID NITROGEN (COSMETIC)

## 2023-12-04 ASSESSMENT — LOCATION SIMPLE DESCRIPTION DERM
LOCATION SIMPLE: LEFT UPPER ARM
LOCATION SIMPLE: LEFT FOREARM
LOCATION SIMPLE: LEFT CALF
LOCATION SIMPLE: RIGHT FOREARM
LOCATION SIMPLE: RIGHT CALF
LOCATION SIMPLE: LEFT CHEEK
LOCATION SIMPLE: RIGHT PRETIBIAL REGION
LOCATION SIMPLE: RIGHT THIGH
LOCATION SIMPLE: LEFT POSTERIOR THIGH
LOCATION SIMPLE: LEFT THIGH
LOCATION SIMPLE: RIGHT UPPER BACK
LOCATION SIMPLE: RIGHT CHEEK
LOCATION SIMPLE: RIGHT ELBOW
LOCATION SIMPLE: RIGHT POSTERIOR THIGH
LOCATION SIMPLE: LEFT CLAVICULAR SKIN
LOCATION SIMPLE: RIGHT LOWER BACK
LOCATION SIMPLE: LEFT SHOULDER
LOCATION SIMPLE: RIGHT ANTERIOR NECK
LOCATION SIMPLE: LEFT PRETIBIAL REGION
LOCATION SIMPLE: LEFT LOWER BACK
LOCATION SIMPLE: RIGHT CLAVICULAR SKIN
LOCATION SIMPLE: TRAPEZIAL NECK
LOCATION SIMPLE: LEFT TEMPLE
LOCATION SIMPLE: LEFT UPPER BACK
LOCATION SIMPLE: RIGHT SHOULDER

## 2023-12-04 ASSESSMENT — LOCATION DETAILED DESCRIPTION DERM
LOCATION DETAILED: RIGHT INFERIOR LATERAL UPPER BACK
LOCATION DETAILED: RIGHT DISTAL CALF
LOCATION DETAILED: RIGHT DISTAL POSTERIOR THIGH
LOCATION DETAILED: RIGHT INFERIOR CENTRAL MALAR CHEEK
LOCATION DETAILED: LEFT POSTERIOR SHOULDER
LOCATION DETAILED: RIGHT DISTAL DORSAL FOREARM
LOCATION DETAILED: RIGHT ANTERIOR DISTAL THIGH
LOCATION DETAILED: RIGHT LATERAL UPPER BACK
LOCATION DETAILED: RIGHT SUPERIOR ANTERIOR NECK
LOCATION DETAILED: LEFT DISTAL CALF
LOCATION DETAILED: RIGHT SUPERIOR MEDIAL BUCCAL CHEEK
LOCATION DETAILED: LEFT PROXIMAL DORSAL FOREARM
LOCATION DETAILED: RIGHT PROXIMAL PRETIBIAL REGION
LOCATION DETAILED: LEFT PROXIMAL PRETIBIAL REGION
LOCATION DETAILED: RIGHT ELBOW
LOCATION DETAILED: LEFT ANTERIOR DISTAL THIGH
LOCATION DETAILED: RIGHT CENTRAL MALAR CHEEK
LOCATION DETAILED: LEFT CLAVICULAR SKIN
LOCATION DETAILED: LEFT SUPERIOR CENTRAL MALAR CHEEK
LOCATION DETAILED: LEFT PROXIMAL CALF
LOCATION DETAILED: RIGHT POSTERIOR SHOULDER
LOCATION DETAILED: LEFT INFERIOR LATERAL MALAR CHEEK
LOCATION DETAILED: RIGHT SUPERIOR UPPER BACK
LOCATION DETAILED: RIGHT SUPERIOR LATERAL MALAR CHEEK
LOCATION DETAILED: LEFT INFERIOR MEDIAL MIDBACK
LOCATION DETAILED: LEFT DISTAL POSTERIOR THIGH
LOCATION DETAILED: LEFT SUPERIOR MEDIAL BUCCAL CHEEK
LOCATION DETAILED: LEFT DISTAL POSTERIOR UPPER ARM
LOCATION DETAILED: LEFT LATERAL UPPER BACK
LOCATION DETAILED: LEFT ANTERIOR PROXIMAL THIGH
LOCATION DETAILED: RIGHT PROXIMAL CALF
LOCATION DETAILED: LEFT PROXIMAL POSTERIOR THIGH
LOCATION DETAILED: RIGHT SUPERIOR MEDIAL UPPER BACK
LOCATION DETAILED: LEFT LATERAL MALAR CHEEK
LOCATION DETAILED: LEFT SUPERIOR LATERAL UPPER BACK
LOCATION DETAILED: RIGHT CLAVICULAR SKIN
LOCATION DETAILED: RIGHT PROXIMAL POSTERIOR THIGH
LOCATION DETAILED: LEFT MID-UPPER BACK
LOCATION DETAILED: RIGHT ANTERIOR SHOULDER
LOCATION DETAILED: RIGHT SUPERIOR MEDIAL MIDBACK
LOCATION DETAILED: RIGHT VENTRAL DISTAL FOREARM
LOCATION DETAILED: MID TRAPEZIAL NECK
LOCATION DETAILED: LEFT SUPERIOR TEMPLE
LOCATION DETAILED: LEFT DISTAL DORSAL FOREARM
LOCATION DETAILED: RIGHT ANTERIOR PROXIMAL THIGH
LOCATION DETAILED: LEFT INFERIOR MEDIAL UPPER BACK
LOCATION DETAILED: LEFT SUPERIOR UPPER BACK
LOCATION DETAILED: RIGHT MID-UPPER BACK

## 2023-12-04 ASSESSMENT — LOCATION ZONE DERM
LOCATION ZONE: LEG
LOCATION ZONE: TRUNK
LOCATION ZONE: FACE
LOCATION ZONE: ARM
LOCATION ZONE: NECK

## 2023-12-04 NOTE — PROCEDURE: LIQUID NITROGEN (COSMETIC)
Render Post-Care Instructions In Note?: no
Price (Use Numbers Only, No Special Characters Or $): 240
Post-Care Instructions: I reviewed with the patient in detail post-care instructions. Patient is to wear sunprotection, and avoid picking at any of the treated lesions. Pt may apply Vaseline to crusted or scabbing areas.
Detail Level: Simple
Show Spray Paint Technique Variable?: Yes
Spray Paint Text: The liquid nitrogen was applied to the skin utilizing a spray paint frosting technique.
Consent: The patient's consent was obtained including but not limited to risks of crusting, scabbing, blistering, scarring, darker or lighter pigmentary change, recurrence, incomplete removal and infection. The patient understands that the procedure is cosmetic in nature and is not covered by insurance.
Billing Information: Bill by Static Price

## 2023-12-20 ENCOUNTER — PATIENT MESSAGE (OUTPATIENT)
Dept: MEDICAL GROUP | Facility: LAB | Age: 58
End: 2023-12-20
Payer: COMMERCIAL

## 2023-12-20 DIAGNOSIS — F41.1 GAD (GENERALIZED ANXIETY DISORDER): ICD-10-CM

## 2023-12-20 DIAGNOSIS — F32.1 CURRENT MODERATE EPISODE OF MAJOR DEPRESSIVE DISORDER WITHOUT PRIOR EPISODE (HCC): ICD-10-CM

## 2023-12-21 RX ORDER — ESCITALOPRAM OXALATE 20 MG/1
20 TABLET ORAL DAILY
Qty: 90 TABLET | Refills: 3 | Status: SHIPPED | OUTPATIENT
Start: 2023-12-21

## 2024-01-02 ENCOUNTER — APPOINTMENT (OUTPATIENT)
Dept: VASCULAR LAB | Facility: MEDICAL CENTER | Age: 59
End: 2024-01-02
Payer: COMMERCIAL

## 2024-02-08 ENCOUNTER — APPOINTMENT (OUTPATIENT)
Dept: VASCULAR LAB | Facility: MEDICAL CENTER | Age: 59
End: 2024-02-08
Payer: COMMERCIAL

## 2024-02-29 ENCOUNTER — HOSPITAL ENCOUNTER (OUTPATIENT)
Dept: LAB | Facility: MEDICAL CENTER | Age: 59
End: 2024-02-29
Attending: FAMILY MEDICINE
Payer: COMMERCIAL

## 2024-02-29 DIAGNOSIS — E78.2 MIXED HYPERLIPIDEMIA: ICD-10-CM

## 2024-02-29 PROCEDURE — 83704 LIPOPROTEIN BLD QUAN PART: CPT

## 2024-02-29 PROCEDURE — 36415 COLL VENOUS BLD VENIPUNCTURE: CPT

## 2024-02-29 PROCEDURE — 83721 ASSAY OF BLOOD LIPOPROTEIN: CPT

## 2024-02-29 PROCEDURE — 81401 MOPATH PROCEDURE LEVEL 2: CPT

## 2024-02-29 PROCEDURE — 80061 LIPID PANEL: CPT

## 2024-02-29 PROCEDURE — 82172 ASSAY OF APOLIPOPROTEIN: CPT

## 2024-03-01 ENCOUNTER — TELEPHONE (OUTPATIENT)
Dept: VASCULAR LAB | Facility: MEDICAL CENTER | Age: 59
End: 2024-03-01
Payer: COMMERCIAL

## 2024-03-01 NOTE — TELEPHONE ENCOUNTER
Established patient  Chart prep for upcoming appointment with Dr. Zamora    Any pending/incomplete orders from last visit? No, all orders completed.  Were any records requested?  No  Correct appointment type (New/Established/virtual)? Yes  Referral up to date? Yes  Referral attached to appointment (renewals and New patients only)? N/A (established with up-to-date referral)

## 2024-03-02 LAB
APO B100 SERPL-MCNC: 111 MG/DL (ref 66–133)
LDLC SERPL-MCNC: 126 MG/DL (ref 0–129)

## 2024-03-04 LAB
CHOLEST SERPL-MCNC: 207 MG/DL
HDL PARTICAL NO Q4363: 33.8 UMOL/L
HDL SIZE Q4361: 8.1 NM
HDLC SERPL-MCNC: 45 MG/DL (ref 40–59)
HLD.LARGE SERPL-SCNC: <2.8 UMOL/L
L VLDL PART NO Q4357: 4.3 NMOL/L
LDL SERPL QN: 20.2 NM
LDL SERPL-SCNC: 1757 NMOL/L
LDL SMALL SERPL-SCNC: 890 NMOL/L
LDLC SERPL CALC-MCNC: 120 MG/DL
PATHOLOGY STUDY: ABNORMAL
TEST NAME 95000: NORMAL
TRIGL SERPL-MCNC: 211 MG/DL (ref 30–149)
VLDL SIZE Q4362: 49.4 NM

## 2024-03-05 ENCOUNTER — OFFICE VISIT (OUTPATIENT)
Dept: VASCULAR LAB | Facility: MEDICAL CENTER | Age: 59
End: 2024-03-05
Attending: FAMILY MEDICINE
Payer: COMMERCIAL

## 2024-03-05 VITALS
DIASTOLIC BLOOD PRESSURE: 79 MMHG | HEART RATE: 76 BPM | WEIGHT: 242 LBS | BODY MASS INDEX: 31.07 KG/M2 | SYSTOLIC BLOOD PRESSURE: 123 MMHG

## 2024-03-05 DIAGNOSIS — E66.9 CLASS 1 OBESITY WITH SERIOUS COMORBIDITY AND BODY MASS INDEX (BMI) OF 31.0 TO 31.9 IN ADULT, UNSPECIFIED OBESITY TYPE: ICD-10-CM

## 2024-03-05 DIAGNOSIS — I25.10 CORONARY ARTERY CALCIFICATION SEEN ON CT SCAN: ICD-10-CM

## 2024-03-05 DIAGNOSIS — Z82.49 FAMILY HISTORY OF PREMATURE CAD: ICD-10-CM

## 2024-03-05 DIAGNOSIS — R03.0 ELEVATED BLOOD PRESSURE READING: ICD-10-CM

## 2024-03-05 DIAGNOSIS — E78.49 FCHL (FAMILIAL COMBINED HYPERLIPIDEMIA): ICD-10-CM

## 2024-03-05 DIAGNOSIS — R93.1 AGATSTON CORONARY ARTERY CALCIUM SCORE LESS THAN 100: ICD-10-CM

## 2024-03-05 PROCEDURE — 99212 OFFICE O/P EST SF 10 MIN: CPT

## 2024-03-05 PROCEDURE — 99214 OFFICE O/P EST MOD 30 MIN: CPT | Performed by: FAMILY MEDICINE

## 2024-03-05 PROCEDURE — 3074F SYST BP LT 130 MM HG: CPT | Performed by: FAMILY MEDICINE

## 2024-03-05 PROCEDURE — 3078F DIAST BP <80 MM HG: CPT | Performed by: FAMILY MEDICINE

## 2024-03-05 RX ORDER — EZETIMIBE 10 MG/1
10 TABLET ORAL DAILY
Qty: 90 TABLET | Refills: 3 | Status: SHIPPED | OUTPATIENT
Start: 2024-03-05

## 2024-03-05 ASSESSMENT — ENCOUNTER SYMPTOMS
ORTHOPNEA: 0
COUGH: 0
WHEEZING: 0
SHORTNESS OF BREATH: 0
PND: 0
SPUTUM PRODUCTION: 0
HEMOPTYSIS: 0
CHILLS: 0
FEVER: 0
CLAUDICATION: 0
PALPITATIONS: 0

## 2024-03-05 ASSESSMENT — FIBROSIS 4 INDEX: FIB4 SCORE: 1.034275590392263543

## 2024-03-05 NOTE — PROGRESS NOTES
[facility] - FOLLOW-UP  03/05/24      Francesco Rodriguez has been referred for evaluation and management of dyslipidemia    Referral Source: Moiz Rincon*     Subjective   Interval hx/concerns: last seen 11/2023, feeling well, had labs. Had stem cell injections for L knee, L shoulder.    Overall mood is good.  No other new symptoms      LIFESTYLE MGMT:  Change in weight:  increase over time   Wt Readings from Last 3 Encounters:   03/05/24 110 kg (242 lb)   11/10/23 108 kg (237 lb)   11/07/23 108 kg (239 lb)   Exercise habits: minimal exercise   Dietary patterns: back to Med diet, low CHO - improved   Etoh: No, had reduced, now stopped 8/2023  Reported barriers to care: MDD    MED MGMT:  Current Prescription Lipid Lowering Medications - including dose:   Statin: rosuvastatin 20mg   Non-Statin: fenofibric acid 135mg   Current Lipid Lowering and Related Supplements: cholest-off, omega 3 FAs  Any Current Side Effects Potentially Related to Lipid Lowering therapy? No  Current Adherence to Lipid Lowering Therapies: Complete    PERTINENT HLD PMHX:  Initial visit hx:  severe HTG = 2,220 in 3/2023 prompting further eval and tx.  Ongoing mixed HLD despite current care.   No hx of acute pancreatitis  History of ASCVD: None  Secondary causes of dyslipidemia:  prior etoh, obesity  Other Established (non-atherosclerotic) Vascular Disease, if Present: None  Age at Initial Diagnosis of Dyslipidemia: 48yo  Previously Attempted Interventions for Lipids - including outcome  Statin: crestor    Outcome:  stopped 10 years ago - was expensive   Non-Statin: None  Outcome: N/A  Any Previous History of Statin Intolerance? No  Baseline Lipids Prior to Treatment:    Latest Reference Range & Units 03/21/23 08:04   Cholesterol,Tot 100 - 199 mg/dL 451 (H)   Triglycerides 0 - 149 mg/dL 2220 (H)   HDL >=40 mg/dL see below   LDL <100 mg/dL see below     Anticoagulation/antiplats: No, no hx of bleeding    HTN:No prior dx or  "meds        Current Outpatient Medications:     ezetimibe, 10 mg, Oral, DAILY    escitalopram, 20 mg, Oral, DAILY, Taking    Fenofibric Acid, 1 Tablet, Oral, DAILY, Taking    IBUPROFEN PO, Take  by mouth., Taking    rosuvastatin, 20 mg, Oral, Q EVENING, Taking    vitamin e, 400 Units, Oral, DAILY, Taking    cetirizine, 10 mg, Oral, DAILY, Taking    ALLERGIES: Patient has no known allergies.    Family History   Problem Relation Age of Onset    Heart Disease Mother 60        s/p CABG    Hyperlipidemia Mother     Heart Failure Mother         cause of death    Heart Disease Father 65        s/p CABG, mid-60s    Hyperlipidemia Father     Peripheral vascular disease Father         smoker, stenting    Heart Failure Father 79        cause of death?    COPD Father     Hypertension Brother     Hyperlipidemia Brother     No Known Problems Brother     No Known Problems Son     Diabetes Neg Hx     Stroke Neg Hx      Social History     Tobacco Use    Smoking status: Never    Smokeless tobacco: Never   Vaping Use    Vaping Use: Never used   Substance Use Topics    Alcohol use: Yes     Alcohol/week: 8.4 oz     Types: 9 Cans of beer, 5 Shots of liquor per week     Comment: socially    Drug use: Not Currently     Types: Marijuana     Comment: \"less than once a week\"     Review of Systems   Constitutional:  Negative for chills, fever and malaise/fatigue.   Respiratory:  Negative for cough, hemoptysis, sputum production, shortness of breath and wheezing.    Cardiovascular:  Negative for chest pain, palpitations, orthopnea, claudication, leg swelling and PND.       Objective    Vitals:    03/05/24 0915   BP: 123/79   BP Location: Left arm   Patient Position: Sitting   BP Cuff Size: Large adult   Pulse: 76   Weight: 110 kg (242 lb)     BP Readings from Last 5 Encounters:   03/05/24 123/79   11/10/23 125/81   11/07/23 128/72   07/06/23 122/79   05/08/23 130/74      BMI Readings from Last 1 Encounters:   03/05/24 31.07 kg/m²     BP " Readings from Last 5 Encounters:   03/05/24 123/79   11/10/23 125/81   11/07/23 128/72   07/06/23 122/79   05/08/23 130/74     Physical Exam  Constitutional:       Appearance: Normal appearance. He is well-developed.   HENT:      Head: Normocephalic and atraumatic.   Eyes:      Conjunctiva/sclera: Conjunctivae normal.      Pupils: Pupils are equal, round, and reactive to light.      Comments: No corneal arcus   Neck:      Thyroid: No thyromegaly.      Vascular: No JVD.   Cardiovascular:      Rate and Rhythm: Normal rate and regular rhythm.      Pulses:           Radial pulses are 2+ on the right side and 2+ on the left side.        Dorsalis pedis pulses are 2+ on the right side and 2+ on the left side.        Posterior tibial pulses are 2+ on the right side and 2+ on the left side.      Heart sounds: Normal heart sounds. No murmur heard.     No friction rub. No gallop.   Pulmonary:      Effort: Pulmonary effort is normal. No respiratory distress.      Breath sounds: Normal breath sounds.   Musculoskeletal:      Cervical back: Normal range of motion and neck supple.      Comments: No achilles tendon thickening  No extensor surface xanthomas at dorsal hands, patellae, achilles.    Has multiple flexor surface tendon nodularity that appears to be dupuytren's contractures   Lymphadenopathy:      Cervical: No cervical adenopathy.   Skin:     General: Skin is warm and dry.      Comments: No periocular xanthelasma   Neurological:      General: No focal deficit present.      Mental Status: He is alert and oriented to person, place, and time.      Cranial Nerves: No cranial nerve deficit.       DATA REVIEW:  Most Recent Lipid Panel:   Lab Results   Component Value Date    CHOLSTRLTOT 207 (H) 02/29/2024    CHOLSTRLTOT 288 (H) 11/03/2023    TRIGLYCERIDE 211 (H) 02/29/2024    TRIGLYCERIDE 687 (H) 11/03/2023    HDL 45 02/29/2024    HDL 42 11/03/2023    LDL see below 11/03/2023     Lab Results   Component Value Date/Time    LDL  see below 11/03/2023 08:02 AM     (H) 06/28/2023 07:29 AM     (H) 05/03/2023 08:11 AM    LDL see below 03/21/2023 08:04 AM        Latest Reference Range & Units 02/29/24 08:04   Cholesterol,Tot <=199 mg/dL 207 (H)   Triglycerides 30 - 149 mg/dL 211 (H)   HDL 40 - 59 mg/dL 45   EER LipoFit by NMR  See Note   LDL Cholesterol <=129 mg/dL 120   HDL Particle No. >=33.0 umol/L 33.8   Small LDL <=634 nmol/L 890 (H)   L-HDL Particle No. >=4.2 umol/L <2.8 (L)   VLDL Size <=46.7 nm 49.4 (H)   L-VLDL Particle No. <=2.7 nmol/L 4.3 (H)   HDL Size >=8.9 nm 8.1 (L)   LDL Particle Size >=20.7 nm 20.2 (L)   LDL Particle <=1135 nmol/L 1757 (H)      Latest Reference Range & Units 06/28/23 07:29 11/03/23 08:02 02/29/24 08:05   LDL Direct 0 - 129 mg/dL 145 (H) 108 126      Latest Reference Range & Units 05/03/23 08:11 06/28/23 07:29 11/03/23 08:02 02/29/24 08:04   Triglycerides 30 - 149 mg/dL 272 (H) 388 (H) 687 (H) 211 (H)     Lab Results   Component Value Date/Time    LIPOPROTA 37 (H) 06/28/2023 07:29 AM      Lab Results   Component Value Date/Time    APOB 111 02/29/2024 08:05 AM        Latest Reference Range & Units 06/28/23 07:29 11/03/23 08:02 02/29/24 08:05   Apolipoprotein-B 66 - 133 mg/dL 131 135 (H) 111     Other Pertinent Blood Work:   Lab Results   Component Value Date    SODIUM 139 11/03/2023    POTASSIUM 3.7 11/03/2023    CHLORIDE 102 11/03/2023    CO2 28 11/03/2023    ANION 9.0 11/03/2023    GLUCOSE 94 11/03/2023    BUN 14 11/03/2023    CREATININE 0.81 11/03/2023    CALCIUM 9.4 11/03/2023    ASTSGOT 21 11/03/2023    ALTSGPT 32 11/03/2023    ALKPHOSPHAT 43 11/03/2023    TBILIRUBIN 0.8 11/03/2023    ALBUMIN 4.8 11/03/2023    AGRATIO 2.1 11/03/2023    CRPHIGHSEN 0.5 06/28/2023    CREACTPROT <0.30 03/21/2023    LIPOPROTA 37 (H) 06/28/2023    TSHULTRASEN 2.040 05/03/2023      Latest Reference Range & Units Most Recent   TSH 0.380 - 5.330 uIU/mL 2.040  5/3/23 08:11   Rheumatoid Factor -Neph- 0 - 14 IU/mL  12  3/21/23 08:04   Stat C-Reactive Protein 0.00 - 0.75 mg/dL <0.30  3/21/23 08:04   Ccp Antibodies 0 - 19 Units 2  3/21/23 08:04   Antinuclear Antibody None Detected  None Detected  3/21/23 08:04     2/2024    Test name                     Result Flag Units  RefIntvl  -------------------------------------------------------------  APOE Specimen            Whole Blood  APOE Cardiovascular Risk, Genotype  e3/e3     VASCULAR IMAGING:     CACS 5/21/23  Coronary calcification:  LMA - 0.0  LCX - 0.0  LAD - 20.0  RCA - 0.0  PDA - 0.0   Total Calcium Score: 20.0   Percentile: Calcium score is above the 25th percentile for the patient's age and sex.   Other findings:  Heart: Normal size. There appears to be calcification of the aortic valve.  Lungs: Clear.  Mediastinum: Normal.  Upper abdomen: Normal.  Soft tissues: There is apparent left gynecomastia.          ASSESSMENT AND PLAN  1. Mixed hyperlipidemia  ezetimibe (ZETIA) 10 MG Tab    APOLIPOPROTEIN B    LDL, DIRECT    CRP HIGH SENSITIVE (CARDIAC)    Lipid Profile      2. Family history of premature CAD        3. Coronary artery calcification seen on CT scan  ezetimibe (ZETIA) 10 MG Tab    APOLIPOPROTEIN B    LDL, DIRECT    CRP HIGH SENSITIVE (CARDIAC)    Lipid Profile      4. Agatston coronary artery calcium score less than 100        5. Class 1 obesity due to excess calories with serious comorbidity and body mass index (BMI) of 30.0 to 30.9 in adult        6. Elevated blood pressure reading        7. Class 1 obesity with serious comorbidity and body mass index (BMI) of 31.0 to 31.9 in adult, unspecified obesity type          Patient Type, check all that apply: Primary Prevention    Established Atherosclerotic Cardiovascular Disease (ASCVD): yes    1) Asymptomatic, subclinical CAD (evidenced by CACS = 20 (60%ile for age/gender) in 2023)  - no prior dx ASCVD events  - no LMA plaque noted   - no indications for functional testing w/o symptoms   - as reviewed with pt,  "ACC/AHA guidelines for chronic CAD mgmt (2023) support GDMT alone as initial mgmt citing multiple RCTs (ISCHEMIA, COURAGE, SHELIA 2D) demonstrating early revasc strategy plus GDMT did not improve MACE outcomes compared to GDMT alone  Plan:  - continue TLC and med mgmt as noted below   - consider functional testing if new symptoms over time       Other Established (non-atherosclerotic) Vascular Disease, if Present: None    Evidence of Heterozygous Familial Hypercholesterolemia (FH): Possible , prior TC = 451 (unable to calc LDL due to severe HTG)  Strong fhx of premature CAD (see fhx)     Presumed dx:  FCHL (aka \"multifactorial combined HLD\") based upon high LDL-C/high TG with type IIb phenotype   - most commonly inherited DLD (~1 in 200), generally polygenic often with significant famhx of DLD and premature ASCVD.  significant increased risk for premature CVD (>20% develop CHD <59yo) and requires aggressive lipid lowering interventions including diet and meds   - will track apoB moving forward     - apoE genotyping = e3/e3 - normal wild type excludes FDBL     FH genotyping: [facility] 5/2023  CETP VUS - pt does not have baseline hyperalphalipoproteinemia and HDL is average.      - appears that CTEP function is stable  based upon NMR analysis has large VLDL, high number VLDL with high number of small LDL-P and small HDL-C size with low HDL-P  which suggests that CETP transfer capacity is stable, since leading to atherogenic dyslipidemic profile = high TG (VLDL), high small LDL-P, and low HDL-P and size (heightened renal excretion)    - also indicates overproduction or slower clearance/degradation of VLDL particles thus resultant hypertriglyceridemia         ACC/AHA Indication for Statin Therapy, senthil all that apply:  Primary Severe HLD / FH (baseline LDL-C >190)    Calculated Risk for ASCVD, if applicable    The 10-year ASCVD risk score (Brenda DK, et al., 2019) is: 6.5%,  7.5 - <20% \"intermediate risk\"     WELDON risk " "score w/ CAC = 8.8 % (slight \"de-risking)  WELDON risk score w/o CAC = 10.5 %  WELDON CAC percentile for age/gender = 60 %     Other Significant Risk Markers, if any, senthil all that apply   Other: pending further w/u   Lp(a) slight high, not risk-enhancing     Risk-enhancers: Metabolic syndrome  and Persistently elevated trigs >174    Goal LDL-C and nonHDL-C based on Clinic Protocol  Due to HTG >200, as 2/2024   Primary: TG <500 - yes   Secondary: apoB <90 - no     Direct LDL <100 - no    LIFESTYLE INTERVENTIONS:    SMOKING:    reports that he has never smoked. He has never used smokeless tobacco.   - continued complete avoidance of all tobacco products     PHYSICAL ACTIVITY: continue healthy activity to improve CV fitness.  In general, targeting >150min/week of moderate-level activity or as much as tolerated in light of functional status and co-morbidities     WEIGHT MANAGEMENT AND NUTRITION: Mediterranean style dietary approach , Triglyceride-lowering diet with reduced fat calories, reduced simple carbohydrates, reduce/avoid alcohol, and Provide specific dietary approach handouts      LIPID LOWERING MEDICATION MANAGEMENT:     Statin Therapy Recommendations from Today’s Visit:   - continue rosuva to 20mg daily     Non-Statin Medications Recommendations from Today’s Visit:    - continue fenofibric acid 135mg daily - best bioavailable fibrate, ok with max statin  - start zetia 10mg daily     Indication for PCSK9 Inhibitor strategy, if applicable: Not currently indicated    Supplements Recommended at this visit: ok to continue cholest-off     RECOMMENDATIONS FOR OTHER CV RISK FACTORS:    1) BLOOD PRESSURE MANAGEMENT  ACC/AHA (2017) goal <130/80  Home BP at goal: yes  Office BP at goal:  yes   Plan:   - continue healthy diet, activity, weight mgmt   Monitoring:   - routine clinic-based BP measurements at least once annually   Medications: no meds indicated at this time      2) Glycemic Status: Normal    ANTITHROMBOTIC " THERAPY None    OTHER ISSUES: none     Studies Ordered at Todays Visit: none   Blood Work Ordered At Today’s visit: as noted above   Follow-Up: 3mo     Kenney Zamora M.D.  [facility], Board-certified clinical lipidologist   [facility]   [facility]   [phone]

## 2024-06-04 ENCOUNTER — HOSPITAL ENCOUNTER (OUTPATIENT)
Dept: LAB | Facility: MEDICAL CENTER | Age: 59
End: 2024-06-04
Attending: FAMILY MEDICINE
Payer: COMMERCIAL

## 2024-06-04 DIAGNOSIS — E78.49 FCHL (FAMILIAL COMBINED HYPERLIPIDEMIA): ICD-10-CM

## 2024-06-04 DIAGNOSIS — I25.10 CORONARY ARTERY CALCIFICATION SEEN ON CT SCAN: ICD-10-CM

## 2024-06-04 LAB
CHOLEST SERPL-MCNC: 130 MG/DL (ref 100–199)
CRP SERPL HS-MCNC: 0.3 MG/L (ref 0–3)
HDLC SERPL-MCNC: 57 MG/DL
LDLC SERPL CALC-MCNC: 55 MG/DL
TRIGL SERPL-MCNC: 89 MG/DL (ref 0–149)

## 2024-06-04 PROCEDURE — 36415 COLL VENOUS BLD VENIPUNCTURE: CPT

## 2024-06-04 PROCEDURE — 80061 LIPID PANEL: CPT

## 2024-06-04 PROCEDURE — 86141 C-REACTIVE PROTEIN HS: CPT

## 2024-06-04 PROCEDURE — 82172 ASSAY OF APOLIPOPROTEIN: CPT

## 2024-06-06 LAB
APO B100 SERPL-MCNC: 59 MG/DL (ref 66–133)
LDLC SERPL-MCNC: 56 MG/DL (ref 0–129)

## 2024-06-10 ENCOUNTER — OFFICE VISIT (OUTPATIENT)
Dept: VASCULAR LAB | Facility: MEDICAL CENTER | Age: 59
End: 2024-06-10
Attending: FAMILY MEDICINE
Payer: COMMERCIAL

## 2024-06-10 VITALS
HEIGHT: 75 IN | DIASTOLIC BLOOD PRESSURE: 72 MMHG | BODY MASS INDEX: 28.31 KG/M2 | HEART RATE: 72 BPM | WEIGHT: 227.7 LBS | SYSTOLIC BLOOD PRESSURE: 117 MMHG

## 2024-06-10 DIAGNOSIS — I25.10 CORONARY ARTERY CALCIFICATION SEEN ON CT SCAN: ICD-10-CM

## 2024-06-10 DIAGNOSIS — R93.1 AGATSTON CORONARY ARTERY CALCIUM SCORE LESS THAN 100: ICD-10-CM

## 2024-06-10 DIAGNOSIS — E78.49 FCHL (FAMILIAL COMBINED HYPERLIPIDEMIA): ICD-10-CM

## 2024-06-10 DIAGNOSIS — E66.3 OVERWEIGHT WITH BODY MASS INDEX (BMI) OF 28 TO 28.9 IN ADULT: ICD-10-CM

## 2024-06-10 DIAGNOSIS — Z82.49 FAMILY HISTORY OF PREMATURE CAD: ICD-10-CM

## 2024-06-10 PROCEDURE — 3074F SYST BP LT 130 MM HG: CPT | Performed by: FAMILY MEDICINE

## 2024-06-10 PROCEDURE — 99214 OFFICE O/P EST MOD 30 MIN: CPT | Performed by: FAMILY MEDICINE

## 2024-06-10 PROCEDURE — 3078F DIAST BP <80 MM HG: CPT | Performed by: FAMILY MEDICINE

## 2024-06-10 PROCEDURE — 99212 OFFICE O/P EST SF 10 MIN: CPT

## 2024-06-10 RX ORDER — FENOFIBRIC ACID 135 MG/1
1 CAPSULE, DELAYED RELEASE ORAL DAILY
Qty: 100 CAPSULE | Refills: 3 | Status: SHIPPED | OUTPATIENT
Start: 2024-06-10

## 2024-06-10 RX ORDER — ROSUVASTATIN CALCIUM 20 MG/1
20 TABLET, COATED ORAL EVERY EVENING
Qty: 100 TABLET | Refills: 3 | Status: SHIPPED | OUTPATIENT
Start: 2024-06-10 | End: 2024-06-10

## 2024-06-10 RX ORDER — ROSUVASTATIN CALCIUM 20 MG/1
20 TABLET, COATED ORAL EVERY EVENING
Qty: 100 TABLET | Refills: 3 | Status: SHIPPED | OUTPATIENT
Start: 2024-06-10

## 2024-06-10 RX ORDER — EZETIMIBE 10 MG/1
10 TABLET ORAL DAILY
Qty: 100 TABLET | Refills: 3 | Status: SHIPPED | OUTPATIENT
Start: 2024-06-10

## 2024-06-10 RX ORDER — FENOFIBRIC ACID 135 MG/1
1 CAPSULE, DELAYED RELEASE ORAL DAILY
Qty: 100 CAPSULE | Refills: 3 | Status: SHIPPED | OUTPATIENT
Start: 2024-06-10 | End: 2024-06-10

## 2024-06-10 ASSESSMENT — FIBROSIS 4 INDEX: FIB4 SCORE: 1.034275590392263543

## 2024-06-10 ASSESSMENT — ENCOUNTER SYMPTOMS
CLAUDICATION: 0
HEMOPTYSIS: 0
ORTHOPNEA: 0
SPUTUM PRODUCTION: 0
COUGH: 0
FEVER: 0
WHEEZING: 0
SHORTNESS OF BREATH: 0
PND: 0
CHILLS: 0
PALPITATIONS: 0

## 2024-06-10 NOTE — PROGRESS NOTES
[facility] - FOLLOW-UP  03/05/24      Francesco Rodriguez has been referred for evaluation and management of dyslipidemia  Referral Source: Moiz Rincon*   Est 5/2023    Subjective   Interval hx/concerns: last seen 3/2024, feeling well. No other new concerns.  Had labs and tolerating all meds.  Happy with lifestyle changes and have been sustainable for him.      LIFESTYLE MGMT:  Change in weight: reducing over time   Wt Readings from Last 3 Encounters:   06/10/24 103 kg (227 lb 11.2 oz)   03/05/24 110 kg (242 lb)   11/10/23 108 kg (237 lb)   Exercise habits: moderate regular exercise program  2mi/day and gym time 3d/wk   Dietary patterns: Med diet, low CHO - improved   Etoh: occasional, no binges or excess use   Reported barriers to care: none    MED MGMT:  Current Prescription Lipid Lowering Medications  Statin: rosuvastatin 20mg   Non-Statin: fenofibric acid 135mg , zetia   Current Supplements: cholest-off, omega 3 FAs  Any Current Side Effects? No  Current Adherence: Complete    PERTINENT HLD PMHX:  Initial visit hx:  severe HTG = 2,220 in 3/2023 prompting further eval and tx.  Ongoing mixed HLD despite current care.   No hx of acute pancreatitis  History of ASCVD: None  Secondary causes of dyslipidemia:  prior etoh, obesity  Other Established (non-atherosclerotic) Vascular Disease, if Present: None  Age at Initial Diagnosis of Dyslipidemia: 46yo  Previously Attempted Interventions for Lipids - including outcome  Statin: crestor    Outcome:  stopped 10 years ago - was expensive   Non-Statin: None  Outcome: N/A  Any Previous History of Statin Intolerance? No  Baseline Lipids Prior to Treatment:    Latest Reference Range & Units 03/21/23 08:04   Cholesterol,Tot 100 - 199 mg/dL 451 (H)   Triglycerides 0 - 149 mg/dL 2220 (H)   HDL >=40 mg/dL see below   LDL <100 mg/dL see below     Anticoagulation/antiplats: No, no hx of bleeding    HTN:No prior dx or meds      Current Outpatient Medications  "on File Prior to Visit   Medication Sig Dispense Refill    escitalopram (LEXAPRO) 20 MG tablet Take 1 Tablet by mouth every day. 90 Tablet 3    IBUPROFEN PO Take  by mouth.      vitamin e (VITAMIN E) 400 UNIT Cap Take 400 Units by mouth every day.      cetirizine (ZYRTEC) 10 MG Tab Take 10 mg by mouth every day.       No current facility-administered medications on file prior to visit.      ALLERGIES: Patient has no known allergies.    Social History     Tobacco Use    Smoking status: Never    Smokeless tobacco: Never   Vaping Use    Vaping status: Never Used   Substance Use Topics    Alcohol use: Yes     Alcohol/week: 8.4 oz     Types: 9 Cans of beer, 5 Shots of liquor per week     Comment: socially    Drug use: Not Currently     Types: Marijuana     Comment: \"less than once a week\"     Review of Systems   Constitutional:  Negative for chills, fever and malaise/fatigue.   Respiratory:  Negative for cough, hemoptysis, sputum production, shortness of breath and wheezing.    Cardiovascular:  Negative for chest pain, palpitations, orthopnea, claudication, leg swelling and PND.       Objective    Vitals:    06/10/24 1002   BP: 117/72   BP Location: Left arm   Patient Position: Sitting   BP Cuff Size: Adult long   Pulse: 72   Weight: 103 kg (227 lb 11.2 oz)   Height: 1.905 m (6' 3\")     BP Readings from Last 5 Encounters:   06/10/24 117/72   03/05/24 123/79   11/10/23 125/81   11/07/23 128/72   07/06/23 122/79      BMI Readings from Last 1 Encounters:   06/10/24 28.46 kg/m²     Physical Exam  Constitutional:       Appearance: Normal appearance. He is well-developed.   HENT:      Head: Normocephalic and atraumatic.   Eyes:      Conjunctiva/sclera: Conjunctivae normal.      Pupils: Pupils are equal, round, and reactive to light.      Comments: No corneal arcus   Neck:      Thyroid: No thyromegaly.      Vascular: No JVD.   Cardiovascular:      Rate and Rhythm: Normal rate and regular rhythm.      Pulses:           Radial " pulses are 2+ on the right side and 2+ on the left side.        Dorsalis pedis pulses are 2+ on the right side and 2+ on the left side.        Posterior tibial pulses are 2+ on the right side and 2+ on the left side.      Heart sounds: Normal heart sounds. No murmur heard.     No friction rub. No gallop.   Pulmonary:      Effort: Pulmonary effort is normal. No respiratory distress.      Breath sounds: Normal breath sounds.   Musculoskeletal:      Cervical back: Normal range of motion and neck supple.      Comments: No achilles tendon thickening  No extensor surface xanthomas at dorsal hands, patellae, achilles.    Has multiple flexor surface tendon nodularity that appears to be dupuytren's contractures   Lymphadenopathy:      Cervical: No cervical adenopathy.   Skin:     General: Skin is warm and dry.      Comments: No periocular xanthelasma   Neurological:      General: No focal deficit present.      Mental Status: He is alert and oriented to person, place, and time.      Cranial Nerves: No cranial nerve deficit.       DATA REVIEW:  Most Recent Lipid Panel:   Lab Results   Component Value Date    CHOLSTRLTOT 130 06/04/2024    TRIGLYCERIDE 89 06/04/2024    HDL 57 06/04/2024    LDL 55 06/04/2024     Lab Results   Component Value Date/Time    LDL 55 06/04/2024 10:01 AM    LDL see below 11/03/2023 08:02 AM     (H) 06/28/2023 07:29 AM     (H) 05/03/2023 08:11 AM    LDL see below 03/21/2023 08:04 AM        Latest Reference Range & Units 02/29/24 08:04   Cholesterol,Tot <=199 mg/dL 207 (H)   Triglycerides 30 - 149 mg/dL 211 (H)   HDL 40 - 59 mg/dL 45   EER LipoFit by NMR  See Note   LDL Cholesterol <=129 mg/dL 120   HDL Particle No. >=33.0 umol/L 33.8   Small LDL <=634 nmol/L 890 (H)   L-HDL Particle No. >=4.2 umol/L <2.8 (L)   VLDL Size <=46.7 nm 49.4 (H)   L-VLDL Particle No. <=2.7 nmol/L 4.3 (H)   HDL Size >=8.9 nm 8.1 (L)   LDL Particle Size >=20.7 nm 20.2 (L)   LDL Particle <=1135 nmol/L 1757 (H)       Latest Reference Range & Units 06/28/23 07:29 11/03/23 08:02 02/29/24 08:05   LDL Direct 0 - 129 mg/dL 145 (H) 108 126      Latest Reference Range & Units 05/03/23 08:11 06/28/23 07:29 11/03/23 08:02 02/29/24 08:04   Triglycerides 30 - 149 mg/dL 272 (H) 388 (H) 687 (H) 211 (H)     Lab Results   Component Value Date/Time    LIPOPROTA 37 (H) 06/28/2023 07:29 AM      Lab Results   Component Value Date/Time    APOB 59 (L) 06/04/2024 10:01 AM      Lab Results   Component Value Date/Time    CRPHIGHSEN 0.3 06/04/2024 10:01 AM       Other Pertinent Blood Work:   Lab Results   Component Value Date    SODIUM 139 11/03/2023    POTASSIUM 3.7 11/03/2023    CHLORIDE 102 11/03/2023    CO2 28 11/03/2023    ANION 9.0 11/03/2023    GLUCOSE 94 11/03/2023    BUN 14 11/03/2023    CREATININE 0.81 11/03/2023    CALCIUM 9.4 11/03/2023    ASTSGOT 21 11/03/2023    ALTSGPT 32 11/03/2023    ALKPHOSPHAT 43 11/03/2023    TBILIRUBIN 0.8 11/03/2023    ALBUMIN 4.8 11/03/2023    AGRATIO 2.1 11/03/2023    CRPHIGHSEN 0.3 06/04/2024    CREACTPROT <0.30 03/21/2023    LIPOPROTA 37 (H) 06/28/2023    TSHULTRASEN 2.040 05/03/2023      Latest Reference Range & Units Most Recent   TSH 0.380 - 5.330 uIU/mL 2.040  5/3/23 08:11   Rheumatoid Factor -Neph- 0 - 14 IU/mL 12  3/21/23 08:04   Stat C-Reactive Protein 0.00 - 0.75 mg/dL <0.30  3/21/23 08:04   Ccp Antibodies 0 - 19 Units 2  3/21/23 08:04   Antinuclear Antibody None Detected  None Detected  3/21/23 08:04     2/2024    Test name                     Result Flag Units  RefIntvl  -------------------------------------------------------------  APOE Specimen            Whole Blood  APOE Cardiovascular Risk, Genotype  e3/e3     VASCULAR IMAGING    CACS 5/21/23  Coronary calcification:  LMA - 0.0  LCX - 0.0  LAD - 20.0  RCA - 0.0  PDA - 0.0   Total Calcium Score: 20.0   Percentile: Calcium score is above the 25th percentile for the patient's age and sex.   Other findings:  Heart: Normal size. There appears to be  "calcification of the aortic valve.  Lungs: Clear.  Mediastinum: Normal.  Upper abdomen: Normal.  Soft tissues: There is apparent left gynecomastia.          ASSESSMENT AND PLAN  1. FCHL (familial combined hyperlipidemia)  Referral to Vascular Medicine    Comp Metabolic Panel    Lipid Profile    CRP HIGH SENSITIVE (CARDIAC)    APOLIPOPROTEIN B    ezetimibe (ZETIA) 10 MG Tab    Choline Fenofibrate (FENOFIBRIC ACID) 135 MG CAPSULE DELAYED RELEASE    rosuvastatin (CRESTOR) 20 MG Tab      2. Coronary artery calcification seen on CT scan  CRP HIGH SENSITIVE (CARDIAC)    ezetimibe (ZETIA) 10 MG Tab      3. Agatston coronary artery calcium score less than 100        4. Family history of premature CAD        5. Overweight with body mass index (BMI) of 28 to 28.9 in adult          Patient Type, check all that apply: Primary Prevention    Established Atherosclerotic Cardiovascular Disease (ASCVD): yes    1) Asymptomatic, subclinical CAD (evidenced by CACS = 20 (60%ile for age/gender) in 2023)  - no prior dx ASCVD events  - no LMA plaque noted   - no indications for functional testing w/o symptoms   - as reviewed with pt, ACC/AHA guidelines for chronic CAD mgmt (2023) support GDMT alone as initial mgmt citing multiple RCTs (ISCHEMIA, COURAGE, SHELIA 2D) demonstrating early revasc strategy plus GDMT did not improve MACE outcomes compared to GDMT alone  Plan:  - continue TLC and med mgmt as noted below   - consider functional testing if new symptoms over time       Other Established (non-atherosclerotic) Vascular Disease None    Evidence of genetic dyslipidemia: Yes , prior TC = 451 (unable to calc LDL due to severe HTG)  Strong fhx of premature CAD (see fhx)     Presumed dx:  FCHL (aka \"multifactorial combined HLD\") based upon high LDL-C/high TG with type IIb phenotype   - most commonly inherited DLD (~1 in 200), generally polygenic often with significant famhx of DLD and premature ASCVD.  significant increased risk for premature " "CVD (>20% develop CHD <61yo) and requires aggressive lipid lowering interventions including diet and meds   - will track apoB moving forward     - apoE genotyping = e3/e3 - normal wild type excludes FDBL     FH genotyping: Adarsh 5/2023  CETP VUS - pt does not have baseline hyperalphalipoproteinemia and HDL is average.      - appears that CTEP function is stable  based upon NMR analysis has large VLDL, high number VLDL with high number of small LDL-P and small HDL-C size with low HDL-P  which suggests that CETP transfer capacity is stable, since leading to atherogenic dyslipidemic profile = high TG (VLDL), high small LDL-P, and low HDL-P and size (heightened renal excretion)    - also indicates overproduction or slower clearance/degradation of VLDL particles thus resultant hypertriglyceridemia         ACC/AHA Indication for Statin Therapy, senthil all that apply:  Primary Severe HLD / FH (baseline LDL-C >190)    Calculated Risk for ASCVD, if applicable    The 10-year ASCVD risk score (Brenda DK, et al., 2019) is: 2.9%,  7.5 - <20% \"intermediate risk\"     WELDON risk score w/ CAC = 8.8 % (slight \"de-risking)  WELDON risk score w/o CAC = 10.5 %  WELDON CAC percentile for age/gender = 60 %     Other Significant Risk Markers, if any, senthil all that apply   Other: pending further w/u   Lp(a) slight high, not risk-enhancing     Risk-enhancers: Metabolic syndrome  and Persistently elevated trigs >174    Goal LDL-C and nonHDL-C based on Clinic Protocol  Due to HTG >200, as 2/2024   Primary: TG <500 - yes   Secondary: apoB <90 - no     Direct LDL <100 - no    LIFESTYLE INTERVENTIONS  TOBACCO: continued complete avoidance of all tobacco products   PHYSICAL ACTIVITY: >150min/week of mod-intensity activity or as much as tolerated  NUTRITION: Mediterranean   ETOH: complete abstinence recommended  WT MGMT: maintain healthy weight - focus on 5-7% reduction over time    LIPID LOWERING MEDICATION MANAGEMENT:     Statin Therapy:   - continue " rosuva to 20mg daily     Non-Statin Medications:    - continue fenofibric acid 135mg daily - best bioavailable fibrate, ok with max statin  - continue zetia 10mg daily     Indication for PCSK9 Inhibitor strategy: Not currently indicated    Supplements: ok to continue cholest-off     BLOOD PRESSURE MANAGEMENT  ACC/AHA (2017) goal <130/80  Home BP at goal: yes  Office BP at goal:  yes   Plan:   - continue healthy diet, activity, weight mgmt   - routine clinic-based BP measurements at least once annually   Medications: no meds indicated at this time      GLYCEMIC STATUS:  Normal    ANTITHROMBOTIC THERAPY None    OTHER none     Studies Ordered at Todays Visit: none   Blood Work Ordered At Today’s visit: as noted above   Follow-Up: 6mo    Kenney Zamora M.D.  FERNY, Board-certified clinical lipidologist   [facility]   [facility]   [phone]

## 2024-06-25 ENCOUNTER — PATIENT MESSAGE (OUTPATIENT)
Dept: MEDICAL GROUP | Facility: LAB | Age: 59
End: 2024-06-25
Payer: COMMERCIAL

## 2024-06-25 DIAGNOSIS — F41.1 GAD (GENERALIZED ANXIETY DISORDER): ICD-10-CM

## 2024-06-25 DIAGNOSIS — F32.1 CURRENT MODERATE EPISODE OF MAJOR DEPRESSIVE DISORDER WITHOUT PRIOR EPISODE (HCC): ICD-10-CM

## 2024-06-25 RX ORDER — ESCITALOPRAM OXALATE 5 MG/1
5 TABLET ORAL DAILY
Qty: 14 TABLET | Refills: 0 | Status: SHIPPED | OUTPATIENT
Start: 2024-06-25 | End: 2024-07-09

## 2024-06-25 RX ORDER — ESCITALOPRAM OXALATE 10 MG/1
10 TABLET ORAL DAILY
Qty: 14 TABLET | Refills: 0 | Status: SHIPPED | OUTPATIENT
Start: 2024-06-25 | End: 2024-07-09

## 2024-11-05 ENCOUNTER — PATIENT MESSAGE (OUTPATIENT)
Dept: VASCULAR LAB | Facility: MEDICAL CENTER | Age: 59
End: 2024-11-05
Payer: COMMERCIAL

## 2024-11-05 DIAGNOSIS — E78.49 FCHL (FAMILIAL COMBINED HYPERLIPIDEMIA): ICD-10-CM

## 2024-11-06 RX ORDER — ROSUVASTATIN CALCIUM 20 MG/1
20 TABLET, COATED ORAL EVERY EVENING
Qty: 100 TABLET | Refills: 3 | Status: SHIPPED | OUTPATIENT
Start: 2024-11-06

## 2024-11-06 RX ORDER — FENOFIBRIC ACID 135 MG/1
1 CAPSULE, DELAYED RELEASE ORAL DAILY
Qty: 100 CAPSULE | Refills: 3 | Status: SHIPPED | OUTPATIENT
Start: 2024-11-06

## 2024-11-18 ENCOUNTER — APPOINTMENT (RX ONLY)
Dept: URBAN - METROPOLITAN AREA CLINIC 35 | Facility: CLINIC | Age: 59
Setting detail: DERMATOLOGY
End: 2024-11-18

## 2024-11-18 DIAGNOSIS — L82.1 OTHER SEBORRHEIC KERATOSIS: ICD-10-CM

## 2024-11-18 DIAGNOSIS — Z71.89 OTHER SPECIFIED COUNSELING: ICD-10-CM

## 2024-11-18 DIAGNOSIS — D22 MELANOCYTIC NEVI: ICD-10-CM

## 2024-11-18 DIAGNOSIS — L81.4 OTHER MELANIN HYPERPIGMENTATION: ICD-10-CM

## 2024-11-18 DIAGNOSIS — L57.0 ACTINIC KERATOSIS: ICD-10-CM

## 2024-11-18 PROBLEM — D22.5 MELANOCYTIC NEVI OF TRUNK: Status: ACTIVE | Noted: 2024-11-18

## 2024-11-18 PROCEDURE — ? COUNSELING

## 2024-11-18 PROCEDURE — 99213 OFFICE O/P EST LOW 20 MIN: CPT | Mod: 25

## 2024-11-18 PROCEDURE — ? LIQUID NITROGEN

## 2024-11-18 PROCEDURE — 17000 DESTRUCT PREMALG LESION: CPT

## 2024-11-18 ASSESSMENT — LOCATION ZONE DERM
LOCATION ZONE: FACE
LOCATION ZONE: TRUNK
LOCATION ZONE: ARM

## 2024-11-18 ASSESSMENT — LOCATION DETAILED DESCRIPTION DERM
LOCATION DETAILED: LEFT POSTERIOR SHOULDER
LOCATION DETAILED: EPIGASTRIC SKIN
LOCATION DETAILED: RIGHT POSTERIOR SHOULDER
LOCATION DETAILED: SUPERIOR THORACIC SPINE
LOCATION DETAILED: INFERIOR THORACIC SPINE
LOCATION DETAILED: RIGHT INFERIOR MEDIAL FOREHEAD
LOCATION DETAILED: RIGHT INFERIOR LATERAL FOREHEAD

## 2024-11-18 ASSESSMENT — LOCATION SIMPLE DESCRIPTION DERM
LOCATION SIMPLE: RIGHT SHOULDER
LOCATION SIMPLE: LEFT SHOULDER
LOCATION SIMPLE: ABDOMEN
LOCATION SIMPLE: UPPER BACK
LOCATION SIMPLE: RIGHT FOREHEAD

## 2024-12-05 ENCOUNTER — HOSPITAL ENCOUNTER (OUTPATIENT)
Dept: LAB | Facility: MEDICAL CENTER | Age: 59
End: 2024-12-05
Attending: FAMILY MEDICINE
Payer: COMMERCIAL

## 2024-12-05 DIAGNOSIS — E78.49 FCHL (FAMILIAL COMBINED HYPERLIPIDEMIA): ICD-10-CM

## 2024-12-05 DIAGNOSIS — I25.10 CORONARY ARTERY CALCIFICATION SEEN ON CT SCAN: ICD-10-CM

## 2024-12-05 PROCEDURE — 36415 COLL VENOUS BLD VENIPUNCTURE: CPT

## 2024-12-05 PROCEDURE — 80053 COMPREHEN METABOLIC PANEL: CPT

## 2024-12-05 PROCEDURE — 80061 LIPID PANEL: CPT

## 2024-12-05 PROCEDURE — 86141 C-REACTIVE PROTEIN HS: CPT

## 2024-12-05 PROCEDURE — 82172 ASSAY OF APOLIPOPROTEIN: CPT

## 2024-12-06 LAB
ALBUMIN SERPL BCP-MCNC: 4.7 G/DL (ref 3.2–4.9)
ALBUMIN/GLOB SERPL: 1.9 G/DL
ALP SERPL-CCNC: 37 U/L (ref 30–99)
ALT SERPL-CCNC: 74 U/L (ref 2–50)
ANION GAP SERPL CALC-SCNC: 12 MMOL/L (ref 7–16)
AST SERPL-CCNC: 60 U/L (ref 12–45)
BILIRUB SERPL-MCNC: 0.8 MG/DL (ref 0.1–1.5)
BUN SERPL-MCNC: 17 MG/DL (ref 8–22)
CALCIUM ALBUM COR SERPL-MCNC: 8.8 MG/DL (ref 8.5–10.5)
CALCIUM SERPL-MCNC: 9.4 MG/DL (ref 8.5–10.5)
CHLORIDE SERPL-SCNC: 104 MMOL/L (ref 96–112)
CHOLEST SERPL-MCNC: 207 MG/DL (ref 100–199)
CO2 SERPL-SCNC: 26 MMOL/L (ref 20–33)
CREAT SERPL-MCNC: 1.23 MG/DL (ref 0.5–1.4)
GFR SERPLBLD CREATININE-BSD FMLA CKD-EPI: 68 ML/MIN/1.73 M 2
GLOBULIN SER CALC-MCNC: 2.5 G/DL (ref 1.9–3.5)
GLUCOSE SERPL-MCNC: 93 MG/DL (ref 65–99)
HDLC SERPL-MCNC: 54 MG/DL
LDLC SERPL CALC-MCNC: 112 MG/DL
POTASSIUM SERPL-SCNC: 3.6 MMOL/L (ref 3.6–5.5)
PROT SERPL-MCNC: 7.2 G/DL (ref 6–8.2)
SODIUM SERPL-SCNC: 142 MMOL/L (ref 135–145)
TRIGL SERPL-MCNC: 207 MG/DL (ref 0–149)

## 2024-12-07 LAB — APO B100 SERPL-MCNC: 111 MG/DL (ref 66–133)

## 2024-12-08 LAB — CRP SERPL HS-MCNC: 0.5 MG/L (ref 0–3)

## 2024-12-16 ENCOUNTER — OFFICE VISIT (OUTPATIENT)
Dept: VASCULAR LAB | Facility: MEDICAL CENTER | Age: 59
End: 2024-12-16
Attending: FAMILY MEDICINE
Payer: COMMERCIAL

## 2024-12-16 VITALS
SYSTOLIC BLOOD PRESSURE: 122 MMHG | BODY MASS INDEX: 29.69 KG/M2 | HEIGHT: 73 IN | HEART RATE: 72 BPM | DIASTOLIC BLOOD PRESSURE: 78 MMHG | WEIGHT: 224 LBS

## 2024-12-16 DIAGNOSIS — E66.3 OVERWEIGHT (BMI 25.0-29.9): Chronic | ICD-10-CM

## 2024-12-16 DIAGNOSIS — I25.10 CORONARY ARTERY CALCIFICATION SEEN ON CT SCAN: ICD-10-CM

## 2024-12-16 DIAGNOSIS — Z82.49 FAMILY HISTORY OF PREMATURE CAD: ICD-10-CM

## 2024-12-16 DIAGNOSIS — R74.8 ELEVATED LIVER ENZYMES: ICD-10-CM

## 2024-12-16 DIAGNOSIS — R93.1 AGATSTON CORONARY ARTERY CALCIUM SCORE LESS THAN 100: ICD-10-CM

## 2024-12-16 DIAGNOSIS — E78.49 FCHL (FAMILIAL COMBINED HYPERLIPIDEMIA): ICD-10-CM

## 2024-12-16 PROCEDURE — 99212 OFFICE O/P EST SF 10 MIN: CPT

## 2024-12-16 PROCEDURE — 3078F DIAST BP <80 MM HG: CPT | Performed by: FAMILY MEDICINE

## 2024-12-16 PROCEDURE — 3074F SYST BP LT 130 MM HG: CPT | Performed by: FAMILY MEDICINE

## 2024-12-16 PROCEDURE — 99214 OFFICE O/P EST MOD 30 MIN: CPT | Performed by: FAMILY MEDICINE

## 2024-12-16 RX ORDER — EZETIMIBE 10 MG/1
10 TABLET ORAL DAILY
Qty: 100 TABLET | Refills: 3 | Status: SHIPPED | OUTPATIENT
Start: 2024-12-16

## 2024-12-16 ASSESSMENT — ENCOUNTER SYMPTOMS
PND: 0
ORTHOPNEA: 0
FEVER: 0
CHILLS: 0
COUGH: 0
CLAUDICATION: 0
WHEEZING: 0
SPUTUM PRODUCTION: 0
SHORTNESS OF BREATH: 0
HEMOPTYSIS: 0
PALPITATIONS: 0

## 2024-12-16 ASSESSMENT — FIBROSIS 4 INDEX: FIB4 SCORE: 1.98

## 2024-12-16 NOTE — PROGRESS NOTES
[facility] - FOLLOW-UP  12/16/24      Francesco Rodriguez has been referred for evaluation and management of dyslipidemia  Referral Source: Moiz Rincon*   Est 5/2023    Subjective   Interval hx/concerns: last seen 6/2024, feeling well. No other new concerns.  Had labs and tolerating all meds.      LIFESTYLE MGMT:  Change in weight: reducing over time  Exercise habits: moderate regular exercise program  intermediate level   Dietary patterns: Med diet, low CHO - improved   Etoh: occasional, no binges or excess use   Reported barriers to care: none    MED MGMT:  Current Rxs  Statin: rosuvastatin 20mg   Non-Statin: fenofibric acid 135mg, zetia   Supplements: cholest-off, omega 3 FAs  Any Current Side Effects? No  Adherence: Complete    PERTINENT HLD PMHX:  Initial visit hx:  severe HTG = 2,220 in 3/2023 prompting further eval and tx.  Ongoing mixed HLD despite current care.   No hx of acute pancreatitis  History of ASCVD: None  Secondary causes of dyslipidemia:  prior etoh, obesity  Other Established Vascular Disease: None  Age at Initial Diagnosis of Dyslipidemia: 48yo  Previously Attempted Interventions for Lipids - including outcome  Statin: crestor    Outcome:  stopped 10 years ago - was expensive   Non-Statin: None  Outcome: N/A  Any Previous History of Statin Intolerance? No  Baseline Lipids Prior to Treatment:    Latest Reference Range & Units 03/21/23 08:04   Cholesterol,Tot 100 - 199 mg/dL 451 (H)   Triglycerides 0 - 149 mg/dL 2220 (H)   HDL >=40 mg/dL see below   LDL <100 mg/dL see below     Anticoagulation/antiplats: No, no hx of bleeding    HTN:No prior dx or meds      Current Outpatient Medications on File Prior to Visit   Medication Sig Dispense Refill    Choline Fenofibrate (FENOFIBRIC ACID) 135 MG CAPSULE DELAYED RELEASE Take 1 Tablet by mouth every day. 100 Capsule 3    rosuvastatin (CRESTOR) 20 MG Tab Take 1 Tablet by mouth every evening. 100 Tablet 3    IBUPROFEN PO Take  by  "mouth.      vitamin e (VITAMIN E) 400 UNIT Cap Take 400 Units by mouth every day.      cetirizine (ZYRTEC) 10 MG Tab Take 10 mg by mouth every day.       No current facility-administered medications on file prior to visit.      ALLERGIES: Patient has no known allergies.    Social History     Tobacco Use    Smoking status: Never    Smokeless tobacco: Never   Vaping Use    Vaping status: Never Used   Substance Use Topics    Alcohol use: Yes     Alcohol/week: 8.4 oz     Types: 9 Cans of beer, 5 Shots of liquor per week     Comment: socially    Drug use: Not Currently     Types: Marijuana     Comment: \"less than once a week\"     Review of Systems   Constitutional:  Negative for chills, fever and malaise/fatigue.   Respiratory:  Negative for cough, hemoptysis, sputum production, shortness of breath and wheezing.    Cardiovascular:  Negative for chest pain, palpitations, orthopnea, claudication, leg swelling and PND.       Objective    Vitals:    12/16/24 1011   BP: 122/78   BP Location: Left arm   Patient Position: Sitting   BP Cuff Size: Large adult   Pulse: 72   Weight: 102 kg (224 lb)   Height: 1.854 m (6' 1\")     BP Readings from Last 5 Encounters:   12/16/24 122/78   06/10/24 117/72   03/05/24 123/79   11/10/23 125/81   11/07/23 128/72      BMI Readings from Last 1 Encounters:   12/16/24 29.55 kg/m²     Physical Exam  Constitutional:       Appearance: Normal appearance. He is well-developed.   HENT:      Head: Normocephalic and atraumatic.   Eyes:      Conjunctiva/sclera: Conjunctivae normal.      Pupils: Pupils are equal, round, and reactive to light.      Comments: No corneal arcus   Neck:      Thyroid: No thyromegaly.      Vascular: No JVD.   Cardiovascular:      Rate and Rhythm: Normal rate and regular rhythm.      Pulses:           Radial pulses are 2+ on the right side and 2+ on the left side.        Dorsalis pedis pulses are 2+ on the right side and 2+ on the left side.        Posterior tibial pulses are 2+ " on the right side and 2+ on the left side.      Heart sounds: Normal heart sounds. No murmur heard.     No friction rub. No gallop.   Pulmonary:      Effort: Pulmonary effort is normal. No respiratory distress.      Breath sounds: Normal breath sounds.   Musculoskeletal:      Cervical back: Normal range of motion and neck supple.      Comments: No achilles tendon thickening  No extensor surface xanthomas at dorsal hands, patellae, achilles.    Has multiple flexor surface tendon nodularity that appears to be dupuytren's contractures   Lymphadenopathy:      Cervical: No cervical adenopathy.   Skin:     General: Skin is warm and dry.      Comments: No periocular xanthelasma   Neurological:      General: No focal deficit present.      Mental Status: He is alert and oriented to person, place, and time.      Cranial Nerves: No cranial nerve deficit.       DATA REVIEW:  Most Recent Lipid Panel:   Lab Results   Component Value Date    CHOLSTRLTOT 207 (H) 12/05/2024    TRIGLYCERIDE 207 (H) 12/05/2024    HDL 54 12/05/2024     (H) 12/05/2024     Lab Results   Component Value Date/Time     (H) 12/05/2024 07:24 AM    LDL 55 06/04/2024 10:01 AM    LDL see below 11/03/2023 08:02 AM     (H) 06/28/2023 07:29 AM     (H) 05/03/2023 08:11 AM        Latest Reference Range & Units 02/29/24 08:04   Cholesterol,Tot <=199 mg/dL 207 (H)   Triglycerides 30 - 149 mg/dL 211 (H)   HDL 40 - 59 mg/dL 45   EER LipoFit by NMR  See Note   LDL Cholesterol <=129 mg/dL 120   HDL Particle No. >=33.0 umol/L 33.8   Small LDL <=634 nmol/L 890 (H)   L-HDL Particle No. >=4.2 umol/L <2.8 (L)   VLDL Size <=46.7 nm 49.4 (H)   L-VLDL Particle No. <=2.7 nmol/L 4.3 (H)   HDL Size >=8.9 nm 8.1 (L)   LDL Particle Size >=20.7 nm 20.2 (L)   LDL Particle <=1135 nmol/L 1757 (H)      Latest Reference Range & Units 06/28/23 07:29 11/03/23 08:02 02/29/24 08:05   LDL Direct 0 - 129 mg/dL 145 (H) 108 126      Latest Reference Range & Units 05/03/23  08:11 06/28/23 07:29 11/03/23 08:02 02/29/24 08:04   Triglycerides 30 - 149 mg/dL 272 (H) 388 (H) 687 (H) 211 (H)     Lab Results   Component Value Date/Time    LIPOPROTA 37 (H) 06/28/2023 07:29 AM      Lab Results   Component Value Date/Time    APOB 111 12/05/2024 07:24 AM    APOB 59 (L) 06/04/2024 10:01 AM    APOB 111 02/29/2024 08:05 AM      Lab Results   Component Value Date/Time    CRPHIGHSEN 0.5 12/05/2024 07:24 AM    CRPHIGHSEN 0.3 06/04/2024 10:01 AM    CRPHIGHSEN 0.5 06/28/2023 07:29 AM       Other Pertinent Blood Work:   Lab Results   Component Value Date    SODIUM 142 12/05/2024    POTASSIUM 3.6 12/05/2024    CHLORIDE 104 12/05/2024    CO2 26 12/05/2024    ANION 12.0 12/05/2024    GLUCOSE 93 12/05/2024    BUN 17 12/05/2024    CREATININE 1.23 12/05/2024    CALCIUM 9.4 12/05/2024    ASTSGOT 60 (H) 12/05/2024    ALTSGPT 74 (H) 12/05/2024    ALKPHOSPHAT 37 12/05/2024    TBILIRUBIN 0.8 12/05/2024    ALBUMIN 4.7 12/05/2024    AGRATIO 1.9 12/05/2024    CRPHIGHSEN 0.5 12/05/2024    CREACTPROT <0.30 03/21/2023    LIPOPROTA 37 (H) 06/28/2023    TSHULTRASEN 2.040 05/03/2023      Latest Reference Range & Units 06/28/23 07:29 11/03/23 08:02 12/05/24 07:24   AST(SGOT) 12 - 45 U/L 19 21 60 (H)   ALT(SGPT) 2 - 50 U/L 29 32 74 (H)   Alkaline Phosphatase 30 - 99 U/L 47 43 37   Total Bilirubin 0.1 - 1.5 mg/dL 1.1 0.8 0.8   Albumin 3.2 - 4.9 g/dL 4.8 4.8 4.7   Total Protein 6.0 - 8.2 g/dL 7.2 7.1 7.2   Globulin 1.9 - 3.5 g/dL 2.4 2.3 2.5        Latest Reference Range & Units Most Recent   TSH 0.380 - 5.330 uIU/mL 2.040  5/3/23 08:11   Rheumatoid Factor -Neph- 0 - 14 IU/mL 12  3/21/23 08:04   Stat C-Reactive Protein 0.00 - 0.75 mg/dL <0.30  3/21/23 08:04   Ccp Antibodies 0 - 19 Units 2  3/21/23 08:04   Antinuclear Antibody None Detected  None Detected  3/21/23 08:04     2/2024    Test name                     Result Flag Units  RefIntvl  -------------------------------------------------------------  APOE Specimen             "Whole Blood  APOE Cardiovascular Risk, Genotype  e3/e3     IMAGING    CACS 5/21/23  Coronary calcification:  LMA - 0.0  LCX - 0.0  LAD - 20.0  RCA - 0.0  PDA - 0.0   Total Calcium Score: 20.0   Percentile: Calcium score is above the 25th percentile for the patient's age and sex.   Other findings:  Heart: Normal size. There appears to be calcification of the aortic valve.  Lungs: Clear.  Mediastinum: Normal.  Upper abdomen: Normal.  Soft tissues: There is apparent left gynecomastia.          ASSESSMENT AND PLAN  1. FCHL (familial combined hyperlipidemia)  APOLIPOPROTEIN B    Lipid Profile    Comp Metabolic Panel    ezetimibe (ZETIA) 10 MG Tab      2. Coronary artery calcification seen on CT scan  ezetimibe (ZETIA) 10 MG Tab      3. Agatston coronary artery calcium score less than 100        4. Family history of premature CAD        5. Elevated liver enzymes        6. Overweight with body mass index (BMI) of 28 to 28.9 in adult          Patient Type: Primary Prevention    MAJOR ASCVD: No    Other Established Vascular Disease     1) Asymptomatic, subclinical CAD (evidenced by CACS = 20 (60%ile for age/gender) in 2023)  - no prior dx ASCVD events  - no LMA plaque noted   - no indications for functional testing w/o symptoms   - as reviewed with pt, ACC/AHA guidelines for chronic CAD mgmt (2023) support GDMT alone as initial mgmt citing multiple RCTs (ISCHEMIA, COURAGE, SHELIA 2D) demonstrating early revasc strategy plus GDMT did not improve MACE outcomes compared to GDMT alone  Plan:  - continue LIFESTYLE and med mgmt as noted below   - consider functional testing if new symptoms over time       Evidence of genetic dyslipidemia: Yes , prior TC = 451 (unable to calc LDL due to severe HTG)  Strong fhx of premature CAD (see fhx)     Presumed dx:  FCHL (aka \"multifactorial combined HLD\") based upon high LDL-C/high TG with type IIb phenotype   - most commonly inherited DLD (~1 in 200), generally polygenic often with significant " "famhx of DLD and premature ASCVD.  significant increased risk for premature CVD (>20% develop CHD <61yo) and requires aggressive lipid lowering interventions including diet and meds   - will track apoB moving forward     - apoE genotyping = e3/e3 - normal wild type excludes FDBL     FH genotyping: Adarsh 5/2023  CETP VUS - pt does not have baseline hyperalphalipoproteinemia and HDL is average.      - appears that CTEP function is stable  based upon NMR analysis has large VLDL, high number VLDL with high number of small LDL-P and small HDL-C size with low HDL-P  which suggests that CETP transfer capacity is stable, since leading to atherogenic dyslipidemic profile = high TG (VLDL), high small LDL-P, and low HDL-P and size (heightened renal excretion)    - also indicates overproduction or slower clearance/degradation of VLDL particles thus resultant hypertriglyceridemia           ACC/AHA Indication for Statin Therapy:  Primary Severe HLD / FH (baseline LDL-C >190)    Calculated Risk for ASCVD, if applicable    The 10-year ASCVD risk score (Brenda DK, et al., 2019) is: 6%,  7.5 - <20% \"intermediate risk\"     WELDON risk score w/ CAC = 8.8 % (slight \"de-risking)  WELDON risk score w/o CAC = 10.5 %  WELDON CAC percentile for age/gender = 60 %     Other Significant Risk Markers, if any, senthil all that apply   Other: pending further w/u   Lp(a) slight high, not risk-enhancing     Risk-enhancers: Metabolic syndrome  and Persistently elevated trigs >174    Goal LDL-C and nonHDL-C based on Clinic Protocol  Due to HTG >200, as of 12/2024   Primary: TG <500 - yes   Secondary: apoB <90 - no     Direct LDL <100 - no    LIFESTYLE INTERVENTIONS  TOBACCO: continued complete avoidance of all tobacco products   PHYSICAL ACTIVITY: >150min/week of mod-intensity activity or as much as tolerated  NUTRITION: Mediterranean   ETOH: complete abstinence recommended  WT MGMT: maintain healthy weight - focus on 5-7% reduction over time    LIPID " LOWERING MEDICATION MANAGEMENT:     Statin Therapy:   - continue rosuva 20mg daily     Non-Statin Medications:    - continue fenofibric acid 135mg daily - best bioavailable fibrate, ok with max statin  - continue zetia 10mg daily     Indication for PCSK9 Inhibitor strategy: Not currently indicated    Supplements: ok to continue cholest-off     BLOOD PRESSURE MANAGEMENT  ACC/AHA (2017) goal <130/80  Home BP at goal: yes  Office BP at goal:  yes   Plan:   - continue healthy diet, activity, weight mgmt   - routine clinic-based BP measurements at least once annually   Medications: no meds indicated at this time      GLYCEMIC STATUS:  Normal    ANTIPLAT THERAPY None    OTHER     # elevated ALT > AST, normal ALP and Tbili - asymptomatic   Recent increase in less healthy diet with wt gain, presumed MASLD  - no prior increase with statin   Plan:  monitor over time, continue healthy diet     STUDIES: none   LABS: as noted above  FOLLOW-UP: 4 months     Kenney Zamora M.D.  FERNY, Board-certified clinical lipidologist   [facility]   [facility]   [phone]

## 2025-01-03 ENCOUNTER — APPOINTMENT (OUTPATIENT)
Dept: MEDICAL GROUP | Facility: LAB | Age: 60
End: 2025-01-03
Payer: COMMERCIAL

## 2025-01-03 VITALS
BODY MASS INDEX: 28.54 KG/M2 | HEART RATE: 83 BPM | WEIGHT: 222.4 LBS | RESPIRATION RATE: 16 BRPM | HEIGHT: 74 IN | TEMPERATURE: 95.9 F | SYSTOLIC BLOOD PRESSURE: 102 MMHG | OXYGEN SATURATION: 98 % | DIASTOLIC BLOOD PRESSURE: 70 MMHG

## 2025-01-03 DIAGNOSIS — Z11.4 SCREENING FOR HIV WITHOUT PRESENCE OF RISK FACTORS: ICD-10-CM

## 2025-01-03 DIAGNOSIS — E78.49 FCHL (FAMILIAL COMBINED HYPERLIPIDEMIA): ICD-10-CM

## 2025-01-03 DIAGNOSIS — Z13.1 SCREENING FOR DIABETES MELLITUS: ICD-10-CM

## 2025-01-03 DIAGNOSIS — Z11.59 ENCOUNTER FOR HEPATITIS C SCREENING TEST FOR LOW RISK PATIENT: ICD-10-CM

## 2025-01-03 DIAGNOSIS — Z00.00 WELL ADULT EXAM: ICD-10-CM

## 2025-01-03 DIAGNOSIS — S16.1XXA STRAIN OF NECK MUSCLE, INITIAL ENCOUNTER: ICD-10-CM

## 2025-01-03 DIAGNOSIS — E78.00 HYPERCHOLESTEROLEMIA: ICD-10-CM

## 2025-01-03 PROCEDURE — 3074F SYST BP LT 130 MM HG: CPT | Performed by: FAMILY MEDICINE

## 2025-01-03 PROCEDURE — 3078F DIAST BP <80 MM HG: CPT | Performed by: FAMILY MEDICINE

## 2025-01-03 PROCEDURE — 99214 OFFICE O/P EST MOD 30 MIN: CPT | Performed by: FAMILY MEDICINE

## 2025-01-03 ASSESSMENT — PATIENT HEALTH QUESTIONNAIRE - PHQ9: CLINICAL INTERPRETATION OF PHQ2 SCORE: 0

## 2025-01-03 ASSESSMENT — FIBROSIS 4 INDEX: FIB4 SCORE: 1.98

## 2025-01-03 NOTE — PROGRESS NOTES
"Subjective:     CC: Neck pain    HPI:   Francesco presents today with concern for neck pain.  Has had recurrent issues with left-sided neck strains with flare of symptoms 2 weeks ago.  Did end up getting into PT quickly but needs referral to continue.  PT has significantly helped.  No traumatic injury.    Needs to follow with vascular medicine for familial combined hyperlipidemia.  He is on Zetia, Crestor, and fenofibrate.    Current Outpatient Medications   Medication Sig Dispense Refill    ezetimibe (ZETIA) 10 MG Tab Take 1 Tablet by mouth every day. To lower cholesterol and heart disease risk 100 Tablet 3    Choline Fenofibrate (FENOFIBRIC ACID) 135 MG CAPSULE DELAYED RELEASE Take 1 Tablet by mouth every day. 100 Capsule 3    rosuvastatin (CRESTOR) 20 MG Tab Take 1 Tablet by mouth every evening. 100 Tablet 3    IBUPROFEN PO Take  by mouth.      vitamin e (VITAMIN E) 400 UNIT Cap Take 400 Units by mouth every day.      cetirizine (ZYRTEC) 10 MG Tab Take 10 mg by mouth every day.       No current facility-administered medications for this visit.       Medications, past medical history, allergies, and social history have been reviewed and updated.      Objective:       Exam:  /70 (BP Location: Left arm, Patient Position: Sitting, BP Cuff Size: Adult)   Pulse 83   Temp (!) 35.5 °C (95.9 °F) (Temporal)   Resp 16   Ht 1.88 m (6' 2\")   Wt 101 kg (222 lb 6.4 oz)   SpO2 98%   BMI 28.55 kg/m²  Body mass index is 28.55 kg/m².    Constitutional: Alert. Well appearing. No distress.  Skin: Warm, dry, good turgor, no visible rashes.  Neck: No C-spine tenderness.  Range of motion slightly limited on rotation to the left.  Minimal left-sided muscular tenderness.  ENMT: Moist mucous membranes. Normal dentition.  Respiratory: Normal effort. Lungs are clear to auscultation bilaterally.  Cardiovascular: Regular rate and rhythm. Normal S1/S2. No murmurs, rubs or gallops.   Neuro: Moves all four extremities. No facial " droop.  Psych: Answers questions appropriately. Normal affect and mood.    Assessment & Plan:     59 y.o. male with the following -     1. Strain of neck muscle, initial encounter  Improving with PT and referral was placed to continue PT.  - Referral to Physical Therapy    2. Screening for diabetes mellitus  - HEMOGLOBIN A1C; Future    3. Hypercholesterolemia  4. FCHL (familial combined hyperlipidemia)  Managed by vascular medicine.  Continue Zetia, Crestor, fenofibrate.    5. Well adult exam  - TSH WITH REFLEX TO FT4; Future  - CBC WITH DIFFERENTIAL; Future    6. Encounter for hepatitis C screening test for low risk patient  - HEP C VIRUS ANTIBODY; Future    7. Screening for HIV without presence of risk factors  - HIV AG/AB COMBO ASSAY SCREENING; Future      Please note that this note was created using voice recognition software.

## 2025-04-07 ENCOUNTER — HOSPITAL ENCOUNTER (OUTPATIENT)
Dept: LAB | Facility: MEDICAL CENTER | Age: 60
End: 2025-04-07
Attending: FAMILY MEDICINE
Payer: COMMERCIAL

## 2025-04-07 DIAGNOSIS — E78.49 FCHL (FAMILIAL COMBINED HYPERLIPIDEMIA): ICD-10-CM

## 2025-04-07 DIAGNOSIS — E78.00 HYPERCHOLESTEROLEMIA: ICD-10-CM

## 2025-04-07 DIAGNOSIS — Z13.1 SCREENING FOR DIABETES MELLITUS: ICD-10-CM

## 2025-04-07 DIAGNOSIS — Z11.59 ENCOUNTER FOR HEPATITIS C SCREENING TEST FOR LOW RISK PATIENT: ICD-10-CM

## 2025-04-07 DIAGNOSIS — Z00.00 WELL ADULT EXAM: ICD-10-CM

## 2025-04-07 DIAGNOSIS — Z11.4 SCREENING FOR HIV WITHOUT PRESENCE OF RISK FACTORS: ICD-10-CM

## 2025-04-07 LAB
ALBUMIN SERPL BCP-MCNC: 4.6 G/DL (ref 3.2–4.9)
ALBUMIN/GLOB SERPL: 1.8 G/DL
ALP SERPL-CCNC: 38 U/L (ref 30–99)
ALT SERPL-CCNC: 62 U/L (ref 2–50)
ANION GAP SERPL CALC-SCNC: 10 MMOL/L (ref 7–16)
AST SERPL-CCNC: 47 U/L (ref 12–45)
BASOPHILS # BLD AUTO: 0.7 % (ref 0–1.8)
BASOPHILS # BLD: 0.04 K/UL (ref 0–0.12)
BILIRUB SERPL-MCNC: 0.8 MG/DL (ref 0.1–1.5)
BUN SERPL-MCNC: 13 MG/DL (ref 8–22)
CALCIUM ALBUM COR SERPL-MCNC: 9 MG/DL (ref 8.5–10.5)
CALCIUM SERPL-MCNC: 9.5 MG/DL (ref 8.5–10.5)
CHLORIDE SERPL-SCNC: 105 MMOL/L (ref 96–112)
CHOLEST SERPL-MCNC: 189 MG/DL (ref 100–199)
CO2 SERPL-SCNC: 27 MMOL/L (ref 20–33)
CREAT SERPL-MCNC: 1.11 MG/DL (ref 0.5–1.4)
EOSINOPHIL # BLD AUTO: 0.19 K/UL (ref 0–0.51)
EOSINOPHIL NFR BLD: 3.4 % (ref 0–6.9)
ERYTHROCYTE [DISTWIDTH] IN BLOOD BY AUTOMATED COUNT: 45.4 FL (ref 35.9–50)
EST. AVERAGE GLUCOSE BLD GHB EST-MCNC: 94 MG/DL
FASTING STATUS PATIENT QL REPORTED: NORMAL
GFR SERPLBLD CREATININE-BSD FMLA CKD-EPI: 77 ML/MIN/1.73 M 2
GLOBULIN SER CALC-MCNC: 2.6 G/DL (ref 1.9–3.5)
GLUCOSE SERPL-MCNC: 89 MG/DL (ref 65–99)
HBA1C MFR BLD: 4.9 % (ref 4–5.6)
HCT VFR BLD AUTO: 42.9 % (ref 42–52)
HCV AB SER QL: NORMAL
HDLC SERPL-MCNC: 57 MG/DL
HGB BLD-MCNC: 14.1 G/DL (ref 14–18)
HIV 1+2 AB+HIV1 P24 AG SERPL QL IA: NORMAL
IMM GRANULOCYTES # BLD AUTO: 0.01 K/UL (ref 0–0.11)
IMM GRANULOCYTES NFR BLD AUTO: 0.2 % (ref 0–0.9)
LDLC SERPL CALC-MCNC: 99 MG/DL
LYMPHOCYTES # BLD AUTO: 1.38 K/UL (ref 1–4.8)
LYMPHOCYTES NFR BLD: 24.8 % (ref 22–41)
MCH RBC QN AUTO: 31.1 PG (ref 27–33)
MCHC RBC AUTO-ENTMCNC: 32.9 G/DL (ref 32.3–36.5)
MCV RBC AUTO: 94.5 FL (ref 81.4–97.8)
MONOCYTES # BLD AUTO: 0.5 K/UL (ref 0–0.85)
MONOCYTES NFR BLD AUTO: 9 % (ref 0–13.4)
NEUTROPHILS # BLD AUTO: 3.45 K/UL (ref 1.82–7.42)
NEUTROPHILS NFR BLD: 61.9 % (ref 44–72)
NRBC # BLD AUTO: 0 K/UL
NRBC BLD-RTO: 0 /100 WBC (ref 0–0.2)
PLATELET # BLD AUTO: 207 K/UL (ref 164–446)
PMV BLD AUTO: 9.9 FL (ref 9–12.9)
POTASSIUM SERPL-SCNC: 3.7 MMOL/L (ref 3.6–5.5)
PROT SERPL-MCNC: 7.2 G/DL (ref 6–8.2)
RBC # BLD AUTO: 4.54 M/UL (ref 4.7–6.1)
SODIUM SERPL-SCNC: 142 MMOL/L (ref 135–145)
TRIGL SERPL-MCNC: 164 MG/DL (ref 0–149)
TSH SERPL DL<=0.005 MIU/L-ACNC: 1.43 UIU/ML (ref 0.38–5.33)
WBC # BLD AUTO: 5.6 K/UL (ref 4.8–10.8)

## 2025-04-07 PROCEDURE — 84443 ASSAY THYROID STIM HORMONE: CPT

## 2025-04-07 PROCEDURE — 80061 LIPID PANEL: CPT

## 2025-04-07 PROCEDURE — 86803 HEPATITIS C AB TEST: CPT

## 2025-04-07 PROCEDURE — 36415 COLL VENOUS BLD VENIPUNCTURE: CPT

## 2025-04-07 PROCEDURE — 83036 HEMOGLOBIN GLYCOSYLATED A1C: CPT

## 2025-04-07 PROCEDURE — 82172 ASSAY OF APOLIPOPROTEIN: CPT

## 2025-04-07 PROCEDURE — 87389 HIV-1 AG W/HIV-1&-2 AB AG IA: CPT

## 2025-04-07 PROCEDURE — 85025 COMPLETE CBC W/AUTO DIFF WBC: CPT

## 2025-04-07 PROCEDURE — 80053 COMPREHEN METABOLIC PANEL: CPT

## 2025-04-08 ENCOUNTER — RESULTS FOLLOW-UP (OUTPATIENT)
Dept: MEDICAL GROUP | Facility: LAB | Age: 60
End: 2025-04-08
Payer: COMMERCIAL

## 2025-04-08 LAB — APO B100 SERPL-MCNC: 94 MG/DL (ref 66–133)

## 2025-04-14 ENCOUNTER — OFFICE VISIT (OUTPATIENT)
Dept: VASCULAR LAB | Facility: MEDICAL CENTER | Age: 60
End: 2025-04-14
Attending: FAMILY MEDICINE
Payer: COMMERCIAL

## 2025-04-14 VITALS
HEART RATE: 72 BPM | BODY MASS INDEX: 27.59 KG/M2 | DIASTOLIC BLOOD PRESSURE: 71 MMHG | SYSTOLIC BLOOD PRESSURE: 104 MMHG | HEIGHT: 74 IN | WEIGHT: 215 LBS

## 2025-04-14 DIAGNOSIS — R93.1 AGATSTON CORONARY ARTERY CALCIUM SCORE LESS THAN 100: ICD-10-CM

## 2025-04-14 DIAGNOSIS — I25.10 CORONARY ARTERY CALCIFICATION SEEN ON CT SCAN: ICD-10-CM

## 2025-04-14 DIAGNOSIS — Z82.49 FAMILY HISTORY OF PREMATURE CAD: ICD-10-CM

## 2025-04-14 DIAGNOSIS — E78.49 FCHL (FAMILIAL COMBINED HYPERLIPIDEMIA): ICD-10-CM

## 2025-04-14 DIAGNOSIS — R74.8 ELEVATED LIVER ENZYMES: ICD-10-CM

## 2025-04-14 PROCEDURE — 99212 OFFICE O/P EST SF 10 MIN: CPT

## 2025-04-14 PROCEDURE — 3074F SYST BP LT 130 MM HG: CPT | Performed by: FAMILY MEDICINE

## 2025-04-14 PROCEDURE — 99214 OFFICE O/P EST MOD 30 MIN: CPT | Performed by: FAMILY MEDICINE

## 2025-04-14 PROCEDURE — 3078F DIAST BP <80 MM HG: CPT | Performed by: FAMILY MEDICINE

## 2025-04-14 ASSESSMENT — ENCOUNTER SYMPTOMS
WHEEZING: 0
CLAUDICATION: 0
SPUTUM PRODUCTION: 0
PALPITATIONS: 0
SHORTNESS OF BREATH: 0
CHILLS: 0
COUGH: 0
HEMOPTYSIS: 0
ORTHOPNEA: 0
PND: 0
FEVER: 0

## 2025-04-14 ASSESSMENT — FIBROSIS 4 INDEX: FIB4 SCORE: 1.64

## 2025-04-14 NOTE — PROGRESS NOTES
[facility] - FOLLOW-UP  04/14/25      Francesco Rodriguez, ref for eval/mgmt of dyslipidemia, est 5/2023  Referral Source: Moiz Rincon*       Subjective   Interval hx/concerns: last seen 12/2024, feeling well. No other new concerns.  Had labs and tolerating all meds.      DYSLIPIDEMIA  LIFESTYLE MGMT:  Change in weight: ongoing wt reduction over time   Exercise habits: moderate regular exercise program  intermediate level   Dietary patterns: Med diet, low CHO - good adherence   Etoh: occasional, no binges or excess use   Reported barriers to care: none  MED MGMT:  Current Rxs  Statin: rosuvastatin 20mg   Non-Statin: fenofibric acid 135mg, zetia   Supplements: cholest-off, omega 3 FAs  Any Current Side Effects? No  Adherence: Complete  PERTINENT HLD PMHX:  Age at Initial Diagnosis: 48yo  Initial visit hx:  severe HTG = 2,220 in 3/2023 prompting further eval and tx.  Ongoing mixed HLD despite current care.   No hx of acute pancreatitis  History of ASCVD: None  Secondary causes of dyslipidemia:  prior etoh, obesity  Other Established Vascular Disease: None    Previously Attempted Interventions for Lipid:  Statin: crestor    Outcome:  stopped 10 years ago - was expensive   Non-Statin: None  Outcome: N/A  Baseline Lipids Prior to Treatment:    Latest Reference Range & Units 03/21/23 08:04   Cholesterol,Tot 100 - 199 mg/dL 451 (H)   Triglycerides 0 - 149 mg/dL 2220 (H)   HDL >=40 mg/dL see below   LDL <100 mg/dL see below     ANTITHROMBOTIC TX: No, no hx of bleeding    HTN:No prior dx or meds      Current Outpatient Medications on File Prior to Visit   Medication Sig Dispense Refill    ezetimibe (ZETIA) 10 MG Tab Take 1 Tablet by mouth every day. To lower cholesterol and heart disease risk 100 Tablet 3    Choline Fenofibrate (FENOFIBRIC ACID) 135 MG CAPSULE DELAYED RELEASE Take 1 Tablet by mouth every day. 100 Capsule 3    rosuvastatin (CRESTOR) 20 MG Tab Take 1 Tablet by mouth every evening.  "100 Tablet 3    IBUPROFEN PO Take  by mouth.      vitamin e (VITAMIN E) 400 UNIT Cap Take 400 Units by mouth every day.      cetirizine (ZYRTEC) 10 MG Tab Take 10 mg by mouth every day.       No current facility-administered medications on file prior to visit.      No Known Allergies     Social History     Tobacco Use    Smoking status: Never    Smokeless tobacco: Never   Vaping Use    Vaping status: Never Used   Substance Use Topics    Alcohol use: Yes     Alcohol/week: 8.4 oz     Types: 9 Cans of beer, 5 Shots of liquor per week     Comment: socially    Drug use: Not Currently     Types: Marijuana     Comment: \"less than once a week\"     Review of Systems   Constitutional:  Negative for chills, fever and malaise/fatigue.   Respiratory:  Negative for cough, hemoptysis, sputum production, shortness of breath and wheezing.    Cardiovascular:  Negative for chest pain, palpitations, orthopnea, claudication, leg swelling and PND.       Objective    Vitals:    04/14/25 1035   BP: 104/71   BP Location: Left arm   Patient Position: Sitting   BP Cuff Size: Large adult   Pulse: 72   Weight: 97.5 kg (215 lb)   Height: 1.88 m (6' 2\")     BP Readings from Last 5 Encounters:   04/14/25 104/71   01/03/25 102/70   12/16/24 122/78   06/10/24 117/72   03/05/24 123/79      BMI Readings from Last 1 Encounters:   04/14/25 27.60 kg/m²     Wt Readings from Last 3 Encounters:   04/14/25 97.5 kg (215 lb)   01/03/25 101 kg (222 lb 6.4 oz)   12/16/24 102 kg (224 lb)      Physical Exam  Constitutional:       Appearance: Normal appearance. He is well-developed.   HENT:      Head: Normocephalic and atraumatic.   Eyes:      Conjunctiva/sclera: Conjunctivae normal.      Pupils: Pupils are equal, round, and reactive to light.      Comments: No corneal arcus   Neck:      Thyroid: No thyromegaly.      Vascular: No JVD.   Cardiovascular:      Rate and Rhythm: Normal rate and regular rhythm.      Pulses:           Radial pulses are 2+ on the right " side and 2+ on the left side.        Dorsalis pedis pulses are 2+ on the right side and 2+ on the left side.        Posterior tibial pulses are 2+ on the right side and 2+ on the left side.      Heart sounds: Normal heart sounds. No murmur heard.     No friction rub. No gallop.   Pulmonary:      Effort: Pulmonary effort is normal. No respiratory distress.      Breath sounds: Normal breath sounds.   Musculoskeletal:      Cervical back: Normal range of motion and neck supple.      Comments: No achilles tendon thickening  No extensor surface xanthomas at dorsal hands, patellae, achilles.    Has multiple flexor surface tendon nodularity that appears to be dupuytren's contractures   Lymphadenopathy:      Cervical: No cervical adenopathy.   Skin:     General: Skin is warm and dry.      Comments: No periocular xanthelasma   Neurological:      General: No focal deficit present.      Mental Status: He is alert and oriented to person, place, and time.      Cranial Nerves: No cranial nerve deficit.       DATA REVIEW:  Most Recent Lipid Panel:   Lab Results   Component Value Date    CHOLSTRLTOT 189 04/07/2025    TRIGLYCERIDE 164 (H) 04/07/2025    HDL 57 04/07/2025    LDL 99 04/07/2025     Lab Results   Component Value Date/Time    LDL 99 04/07/2025 08:08 AM     (H) 12/05/2024 07:24 AM    LDL 55 06/04/2024 10:01 AM    LDL see below 11/03/2023 08:02 AM     (H) 06/28/2023 07:29 AM        Latest Reference Range & Units 02/29/24 08:04   Cholesterol,Tot <=199 mg/dL 207 (H)   Triglycerides 30 - 149 mg/dL 211 (H)   HDL 40 - 59 mg/dL 45   EER LipoFit by NMR  See Note   LDL Cholesterol <=129 mg/dL 120   HDL Particle No. >=33.0 umol/L 33.8   Small LDL <=634 nmol/L 890 (H)   L-HDL Particle No. >=4.2 umol/L <2.8 (L)   VLDL Size <=46.7 nm 49.4 (H)   L-VLDL Particle No. <=2.7 nmol/L 4.3 (H)   HDL Size >=8.9 nm 8.1 (L)   LDL Particle Size >=20.7 nm 20.2 (L)   LDL Particle <=1135 nmol/L 1757 (H)      Latest Reference Range & Units  06/28/23 07:29 11/03/23 08:02 02/29/24 08:05   LDL Direct 0 - 129 mg/dL 145 (H) 108 126      Latest Reference Range & Units 05/03/23 08:11 06/28/23 07:29 11/03/23 08:02 02/29/24 08:04   Triglycerides 30 - 149 mg/dL 272 (H) 388 (H) 687 (H) 211 (H)     Lab Results   Component Value Date/Time    LIPOPROTA 37 (H) 06/28/2023 07:29 AM      Lab Results   Component Value Date/Time    APOB 94 04/07/2025 08:08 AM    APOB 111 12/05/2024 07:24 AM    APOB 59 (L) 06/04/2024 10:01 AM      Lab Results   Component Value Date/Time    CRPHIGHSEN 0.5 12/05/2024 07:24 AM    CRPHIGHSEN 0.3 06/04/2024 10:01 AM    CRPHIGHSEN 0.5 06/28/2023 07:29 AM       Other Pertinent Blood Work:   Lab Results   Component Value Date    SODIUM 142 04/07/2025    POTASSIUM 3.7 04/07/2025    CHLORIDE 105 04/07/2025    CO2 27 04/07/2025    ANION 10.0 04/07/2025    GLUCOSE 89 04/07/2025    BUN 13 04/07/2025    CREATININE 1.11 04/07/2025    CALCIUM 9.5 04/07/2025    ASTSGOT 47 (H) 04/07/2025    ALTSGPT 62 (H) 04/07/2025    ALKPHOSPHAT 38 04/07/2025    TBILIRUBIN 0.8 04/07/2025    ALBUMIN 4.6 04/07/2025    AGRATIO 1.8 04/07/2025    CRPHIGHSEN 0.5 12/05/2024    LIPOPROTA 37 (H) 06/28/2023    TSHULTRASEN 1.430 04/07/2025      Latest Reference Range & Units 12/05/24 07:24 04/07/25 08:08   AST(SGOT) 12 - 45 U/L 60 (H) 47 (H)   ALT(SGPT) 2 - 50 U/L 74 (H) 62 (H)   Alkaline Phosphatase 30 - 99 U/L 37 38   Total Bilirubin 0.1 - 1.5 mg/dL 0.8 0.8      Latest Reference Range & Units Most Recent   TSH 0.380 - 5.330 uIU/mL 2.040  5/3/23 08:11   Rheumatoid Factor -Neph- 0 - 14 IU/mL 12  3/21/23 08:04   Stat C-Reactive Protein 0.00 - 0.75 mg/dL <0.30  3/21/23 08:04   Ccp Antibodies 0 - 19 Units 2  3/21/23 08:04   Antinuclear Antibody None Detected  None Detected  3/21/23 08:04     IMAGING    CACS 5/21/23  Coronary calcification:  LMA - 0.0  LCX - 0.0  LAD - 20.0  RCA - 0.0  PDA - 0.0   Total Calcium Score: 20.0   Percentile: Calcium score is above the 25th percentile for the  "patient's age and sex.   Other findings:  Heart: Normal size. There appears to be calcification of the aortic valve.  Lungs: Clear.  Mediastinum: Normal.  Upper abdomen: Normal.  Soft tissues: There is apparent left gynecomastia.          ASSESSMENT AND PLAN  1. FCHL (familial combined hyperlipidemia)  APOLIPOPROTEIN B    Comp Metabolic Panel    Lipid Profile      2. Coronary artery calcification seen on CT scan        3. Agatston coronary artery calcium score less than 100        4. Family history of premature CAD        5. Elevated liver enzymes            Patient Type: Primary Prevention    MAJOR ASCVD: No    Other Established Vascular Disease     1) Asymptomatic, subclinical CAD (evidenced by CACS = 20 (60%ile for age/gender) in 2023)  - no prior dx ASCVD events  - no LMA plaque noted   - no indications for functional testing w/o symptoms   - as reviewed with pt, ACC/AHA guidelines for chronic CAD mgmt (2023) support GDMT alone as initial mgmt citing multiple RCTs (ISCHEMIA, COURAGE, SHELIA 2D) demonstrating early revasc strategy plus GDMT did not improve MACE outcomes compared to GDMT alone  Plan:  - continue LIFESTYLE and med mgmt as noted below   - consider functional testing if new symptoms over time       Evidence of genetic dyslipidemia: Yes , prior TC = 451 (unable to calc LDL due to severe HTG)  Strong fhx of premature CAD (see fhx)     Presumed dx:  FCHL (aka \"multifactorial combined HLD\") based upon high LDL-C/high TG with type IIb phenotype   - most commonly inherited DLD (~1 in 200), generally polygenic often with significant famhx of DLD and premature ASCVD.  significant increased risk for premature CVD (>20% develop CHD <61yo) and requires aggressive lipid lowering interventions including diet and meds   - will track apoB moving forward     APOE genotyping (2024):  HOMOZYGOUS APO e3 (e3/e3): This is the most   common genotype in the normal population. It is not associated   with an increased risk for " "type III hyperlipoproteinemia.     FH genotyping: Tanishaitaakbar 5/2023  CETP VUS - pt does not have baseline hyperalphalipoproteinemia and HDL is average.      - appears that CTEP function is stable  based upon NMR analysis has large VLDL, high number VLDL with high number of small LDL-P and small HDL-C size with low HDL-P  which suggests that CETP transfer capacity is stable, since leading to atherogenic dyslipidemic profile = high TG (VLDL), high small LDL-P, and low HDL-P and size (heightened renal excretion)    - also indicates overproduction or slower clearance/degradation of VLDL particles thus resultant hypertriglyceridemia           ACC/AHA Indication for Statin Therapy:  Primary Severe HLD / FH (baseline LDL-C >190)    Calculated Risk for ASCVD, if applicable    The 10-year ASCVD risk score (Brenda DOAN, et al., 2019) is: 4%,  7.5 - <20% \"intermediate risk\"     WELDON risk score w/ CAC = 8.8 % (slight \"de-risking)  WELDON risk score w/o CAC = 10.5 %  WELDON CAC percentile for age/gender = 60 %     Other Significant Risk Markers, if any, senthil all that apply   Other: pending further w/u   Lp(a) slight high, not risk-enhancing     Risk-enhancers: Metabolic syndrome  and Persistently elevated trigs >174    Goal LDL-C and apoB/non-HDL-C based on Clinic Protocol  At goal as of 4/2025?   Primary (panc risk reduction):  TG <500 - MET  Secondary (ascvd risk reduction): apoB <90- MET        Non-HDL-C <130 - MET        Direct LDL-C <100 - MET  Tertiary:     TG <150 - NOT MET     LIFESTYLE INTERVENTIONS  TOBACCO: continued complete avoidance of all tobacco products     PHYSICAL ACTIVITY: >150min/week of mod-intensity activity or as much as tolerated    NUTRITION: Mediterranean     ETOH: complete abstinence recommended    WT MGMT: maintain healthy weight - focus on 5-7% reduction over time    LIPID LOWERING MEDICATION MANAGEMENT:     Statin Therapy:   - continue rosuva 20mg daily     Non-Statin Medications:    - continue fenofibric " acid 135mg daily - best bioavailable fibrate, ok with max statin  - continue zetia 10mg daily     Indication for PCSK9 Inhibitor strategy: Not currently indicated    Supplements: ok to continue cholest-off     BLOOD PRESSURE MANAGEMENT  Office BP goal per ACC/AHA <130/80  Home BP at goal: yes  Office BP at goal:  yes   Plan:   - continue healthy diet, activity, weight mgmt   - routine clinic-based BP measurements at least once annually   Medications: no meds indicated at this time      GLYCEMIC STATUS:  Normal    ANTIPLAT THERAPY None    OTHER     # elevated ALT > AST, normal ALP and Tbili - no sx, 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  FERNY, Board-certified clinical lipidologist   [facility]   [facility]   [phone]